# Patient Record
Sex: FEMALE | Race: WHITE | NOT HISPANIC OR LATINO | Employment: OTHER | URBAN - METROPOLITAN AREA
[De-identification: names, ages, dates, MRNs, and addresses within clinical notes are randomized per-mention and may not be internally consistent; named-entity substitution may affect disease eponyms.]

---

## 2021-02-23 DIAGNOSIS — Z01.812 PRE-OPERATIVE LABORATORY EXAMINATION: Primary | ICD-10-CM

## 2021-02-23 RX ORDER — METOPROLOL SUCCINATE 25 MG/1
25 TABLET, EXTENDED RELEASE ORAL
Status: ON HOLD | COMMUNITY
End: 2021-03-09

## 2021-02-23 RX ORDER — ACETAMINOPHEN 160 MG
2000 TABLET,DISINTEGRATING ORAL DAILY
COMMUNITY

## 2021-02-23 RX ORDER — LORATADINE 10 MG/1
10 TABLET ORAL
COMMUNITY

## 2021-02-23 RX ORDER — AMOXICILLIN 500 MG
1200 CAPSULE ORAL DAILY
COMMUNITY

## 2021-02-23 RX ORDER — PYRIDOXINE HCL (VITAMIN B6) 100 MG
1 TABLET ORAL DAILY
COMMUNITY

## 2021-02-23 RX ORDER — LACTOBACILLUS RHAMNOSUS GG 10B CELL
1 CAPSULE ORAL
COMMUNITY

## 2021-02-23 RX ORDER — GABAPENTIN 300 MG/1
600 CAPSULE ORAL DAILY
COMMUNITY

## 2021-02-23 RX ORDER — NAPROXEN SODIUM 220 MG
220 TABLET ORAL 2 TIMES DAILY WITH MEALS
COMMUNITY

## 2021-02-23 RX ORDER — ACETAMINOPHEN 500 MG
1000 TABLET ORAL EVERY 6 HOURS PRN
COMMUNITY

## 2021-02-23 RX ORDER — SIMVASTATIN 20 MG
20 TABLET ORAL AT BEDTIME
COMMUNITY

## 2021-02-23 ASSESSMENT — MIFFLIN-ST. JEOR: SCORE: 2026.56

## 2021-03-04 ASSESSMENT — MIFFLIN-ST. JEOR: SCORE: 2009.01

## 2021-03-05 DIAGNOSIS — Z01.812 PRE-OPERATIVE LABORATORY EXAMINATION: ICD-10-CM

## 2021-03-05 LAB
SARS-COV-2 RNA RESP QL NAA+PROBE: NORMAL
SPECIMEN SOURCE: NORMAL

## 2021-03-05 PROCEDURE — U0005 INFEC AGEN DETEC AMPLI PROBE: HCPCS | Performed by: ORTHOPAEDIC SURGERY

## 2021-03-05 PROCEDURE — U0003 INFECTIOUS AGENT DETECTION BY NUCLEIC ACID (DNA OR RNA); SEVERE ACUTE RESPIRATORY SYNDROME CORONAVIRUS 2 (SARS-COV-2) (CORONAVIRUS DISEASE [COVID-19]), AMPLIFIED PROBE TECHNIQUE, MAKING USE OF HIGH THROUGHPUT TECHNOLOGIES AS DESCRIBED BY CMS-2020-01-R: HCPCS | Performed by: ORTHOPAEDIC SURGERY

## 2021-03-05 NOTE — PHARMACY-ADMISSION MEDICATION HISTORY
Admission medication history interview status for this patient is complete. See [facility] admission navigator for allergy information, prior to admission medications and immunization status.     PTA meds completed by pre-admitting nurse Jayshree Lao and reviewed by pharmacy       Prior to Admission medications    Medication Sig Last Dose Taking? Auth Provider   acetaminophen (TYLENOL) 500 MG tablet Take 1,000 mg by mouth every 6 hours as needed for mild pain  Yes Reported, Patient   calcium carbonate 600 mg-vitamin D 400 units (CALTRATE) 600-400 MG-UNIT per tablet Take 1 tablet by mouth daily  Yes Reported, Patient   Cholecalciferol (VITAMIN D3) 50 MCG (2000 UT) CAPS Take 2,000 Int'l Units by mouth daily  Yes Reported, Patient   Cranberry 500 MG CAPS Take 1 capsule by mouth daily  Yes Reported, Patient   gabapentin (NEURONTIN) 300 MG capsule Take 600 mg by mouth daily  Yes Reported, Patient   lactobacillus rhamnosus, GG, (CULTURELL) capsule Take 1 capsule by mouth daily (with dinner)  Yes Reported, Patient   loratadine (CLARITIN) 10 MG tablet Take 10 mg by mouth daily (with dinner)  Yes Reported, Patient   magnesium oxide 400 MG CAPS Take 1 capsule by mouth At Bedtime  Yes Reported, Patient   metoprolol succinate ER (TOPROL-XL) 25 MG 24 hr tablet Take 25 mg by mouth daily (with dinner)  Yes Reported, Patient   naproxen sodium (ANAPROX) 220 MG tablet Take 220 mg by mouth 2 times daily (with meals)  Yes Reported, Patient   Omega-3 Fatty Acids (FISH OIL) 1200 MG capsule Take 1,200 mg by mouth daily  Yes Reported, Patient   simvastatin (ZOCOR) 20 MG tablet Take 20 mg by mouth At Bedtime  Yes Reported, Patient

## 2021-03-06 LAB
LABORATORY COMMENT REPORT: NORMAL
SARS-COV-2 RNA RESP QL NAA+PROBE: NEGATIVE
SPECIMEN SOURCE: NORMAL

## 2021-03-08 ENCOUNTER — HOSPITAL ENCOUNTER (OUTPATIENT)
Facility: CLINIC | Age: 51
Discharge: HOME | End: 2021-03-09
Attending: ORTHOPAEDIC SURGERY | Admitting: ORTHOPAEDIC SURGERY
Payer: COMMERCIAL

## 2021-03-08 ENCOUNTER — ANESTHESIA (OUTPATIENT)
Dept: SURGERY | Facility: CLINIC | Age: 51
End: 2021-03-08
Payer: COMMERCIAL

## 2021-03-08 ENCOUNTER — ANESTHESIA EVENT (OUTPATIENT)
Dept: SURGERY | Facility: CLINIC | Age: 51
End: 2021-03-08
Payer: COMMERCIAL

## 2021-03-08 ENCOUNTER — APPOINTMENT (OUTPATIENT)
Dept: GENERAL RADIOLOGY | Facility: CLINIC | Age: 51
End: 2021-03-08
Attending: ORTHOPAEDIC SURGERY
Payer: COMMERCIAL

## 2021-03-08 ENCOUNTER — APPOINTMENT (OUTPATIENT)
Dept: PHYSICAL THERAPY | Facility: CLINIC | Age: 51
End: 2021-03-08
Attending: ORTHOPAEDIC SURGERY
Payer: COMMERCIAL

## 2021-03-08 DIAGNOSIS — Z96.641 STATUS POST TOTAL REPLACEMENT OF RIGHT HIP: Primary | ICD-10-CM

## 2021-03-08 PROBLEM — Z96.649 S/P TOTAL HIP ARTHROPLASTY: Status: ACTIVE | Noted: 2021-03-08

## 2021-03-08 LAB
CREAT SERPL-MCNC: 0.68 MG/DL (ref 0.52–1.04)
GFR SERPL CREATININE-BSD FRML MDRD: >90 ML/MIN/{1.73_M2}
HCG UR QL: NEGATIVE

## 2021-03-08 PROCEDURE — 97161 PT EVAL LOW COMPLEX 20 MIN: CPT | Mod: GP | Performed by: PHYSICAL THERAPIST

## 2021-03-08 PROCEDURE — 250N000013 HC RX MED GY IP 250 OP 250 PS 637: Performed by: ORTHOPAEDIC SURGERY

## 2021-03-08 PROCEDURE — 250N000013 HC RX MED GY IP 250 OP 250 PS 637: Performed by: PHYSICIAN ASSISTANT

## 2021-03-08 PROCEDURE — 999N000065 XR PELVIS AND HIP RIGHT 1 VIEW

## 2021-03-08 PROCEDURE — 360N000077 HC SURGERY LEVEL 4, PER MIN: Performed by: ORTHOPAEDIC SURGERY

## 2021-03-08 PROCEDURE — 250N000009 HC RX 250: Performed by: NURSE ANESTHETIST, CERTIFIED REGISTERED

## 2021-03-08 PROCEDURE — 99207 PR CDG-CODE CATEGORY CHANGED: CPT | Performed by: HOSPITALIST

## 2021-03-08 PROCEDURE — 36415 COLL VENOUS BLD VENIPUNCTURE: CPT | Performed by: ORTHOPAEDIC SURGERY

## 2021-03-08 PROCEDURE — 250N000011 HC RX IP 250 OP 636: Performed by: ANESTHESIOLOGY

## 2021-03-08 PROCEDURE — 272N000002 HC OR SUPPLY OTHER OPNP: Performed by: ORTHOPAEDIC SURGERY

## 2021-03-08 PROCEDURE — 710N000009 HC RECOVERY PHASE 1, LEVEL 1, PER MIN: Performed by: ORTHOPAEDIC SURGERY

## 2021-03-08 PROCEDURE — 999N000141 HC STATISTIC PRE-PROCEDURE NURSING ASSESSMENT: Performed by: ORTHOPAEDIC SURGERY

## 2021-03-08 PROCEDURE — 99203 OFFICE O/P NEW LOW 30 MIN: CPT | Performed by: HOSPITALIST

## 2021-03-08 PROCEDURE — 258N000003 HC RX IP 258 OP 636: Performed by: ORTHOPAEDIC SURGERY

## 2021-03-08 PROCEDURE — 272N000001 HC OR GENERAL SUPPLY STERILE: Performed by: ORTHOPAEDIC SURGERY

## 2021-03-08 PROCEDURE — 250N000011 HC RX IP 250 OP 636: Performed by: NURSE ANESTHETIST, CERTIFIED REGISTERED

## 2021-03-08 PROCEDURE — 258N000003 HC RX IP 258 OP 636: Performed by: ANESTHESIOLOGY

## 2021-03-08 PROCEDURE — 82565 ASSAY OF CREATININE: CPT | Performed by: ORTHOPAEDIC SURGERY

## 2021-03-08 PROCEDURE — C1713 ANCHOR/SCREW BN/BN,TIS/BN: HCPCS | Performed by: ORTHOPAEDIC SURGERY

## 2021-03-08 PROCEDURE — 370N000017 HC ANESTHESIA TECHNICAL FEE, PER MIN: Performed by: ORTHOPAEDIC SURGERY

## 2021-03-08 PROCEDURE — 999N000063 XR HIP PORT RT 1 VW: Mod: TC

## 2021-03-08 PROCEDURE — 250N000011 HC RX IP 250 OP 636: Performed by: PHYSICIAN ASSISTANT

## 2021-03-08 PROCEDURE — C1776 JOINT DEVICE (IMPLANTABLE): HCPCS | Performed by: ORTHOPAEDIC SURGERY

## 2021-03-08 PROCEDURE — 250N000011 HC RX IP 250 OP 636: Performed by: ORTHOPAEDIC SURGERY

## 2021-03-08 PROCEDURE — 250N000013 HC RX MED GY IP 250 OP 250 PS 637: Performed by: HOSPITALIST

## 2021-03-08 PROCEDURE — 81025 URINE PREGNANCY TEST: CPT | Performed by: ANESTHESIOLOGY

## 2021-03-08 PROCEDURE — 97530 THERAPEUTIC ACTIVITIES: CPT | Mod: GP | Performed by: PHYSICAL THERAPIST

## 2021-03-08 DEVICE — IMPLANTABLE DEVICE: Type: IMPLANTABLE DEVICE | Site: HIP | Status: FUNCTIONAL

## 2021-03-08 DEVICE — IMP SCR ZIM 6.5X20MM ACET CUP SELF TAP 00-6250-065-20: Type: IMPLANTABLE DEVICE | Site: HIP | Status: FUNCTIONAL

## 2021-03-08 DEVICE — IMPLANTABLE DEVICE
Type: IMPLANTABLE DEVICE | Site: HIP | Status: FUNCTIONAL
Brand: G7® ACETABULAR SYSTEM

## 2021-03-08 DEVICE — IMPLANTABLE DEVICE
Type: IMPLANTABLE DEVICE | Site: HIP | Status: FUNCTIONAL
Brand: G7® DUAL MOBILITY ACETABULAR SYSTEM

## 2021-03-08 RX ORDER — SODIUM CHLORIDE, SODIUM LACTATE, POTASSIUM CHLORIDE, CALCIUM CHLORIDE 600; 310; 30; 20 MG/100ML; MG/100ML; MG/100ML; MG/100ML
INJECTION, SOLUTION INTRAVENOUS CONTINUOUS
Status: DISCONTINUED | OUTPATIENT
Start: 2021-03-08 | End: 2021-03-09 | Stop reason: HOSPADM

## 2021-03-08 RX ORDER — ONDANSETRON 2 MG/ML
4 INJECTION INTRAMUSCULAR; INTRAVENOUS EVERY 6 HOURS
Status: DISCONTINUED | OUTPATIENT
Start: 2021-03-08 | End: 2021-03-08

## 2021-03-08 RX ORDER — HYDROMORPHONE HCL IN WATER/PF 6 MG/30 ML
0.2 PATIENT CONTROLLED ANALGESIA SYRINGE INTRAVENOUS
Status: DISCONTINUED | OUTPATIENT
Start: 2021-03-08 | End: 2021-03-09 | Stop reason: HOSPADM

## 2021-03-08 RX ORDER — NALOXONE HYDROCHLORIDE 0.4 MG/ML
0.2 INJECTION, SOLUTION INTRAMUSCULAR; INTRAVENOUS; SUBCUTANEOUS
Status: ACTIVE | OUTPATIENT
Start: 2021-03-08 | End: 2021-03-09

## 2021-03-08 RX ORDER — OXYCODONE HYDROCHLORIDE 5 MG/1
5 TABLET ORAL EVERY 4 HOURS PRN
Status: DISCONTINUED | OUTPATIENT
Start: 2021-03-08 | End: 2021-03-09 | Stop reason: HOSPADM

## 2021-03-08 RX ORDER — ONDANSETRON 4 MG/1
4 TABLET, ORALLY DISINTEGRATING ORAL EVERY 30 MIN PRN
Status: DISCONTINUED | OUTPATIENT
Start: 2021-03-08 | End: 2021-03-08 | Stop reason: HOSPADM

## 2021-03-08 RX ORDER — ONDANSETRON 2 MG/ML
4 INJECTION INTRAMUSCULAR; INTRAVENOUS EVERY 30 MIN PRN
Status: DISCONTINUED | OUTPATIENT
Start: 2021-03-08 | End: 2021-03-08 | Stop reason: HOSPADM

## 2021-03-08 RX ORDER — NEOSTIGMINE METHYLSULFATE 1 MG/ML
VIAL (ML) INJECTION PRN
Status: DISCONTINUED | OUTPATIENT
Start: 2021-03-08 | End: 2021-03-08

## 2021-03-08 RX ORDER — PROPOFOL 10 MG/ML
INJECTION, EMULSION INTRAVENOUS CONTINUOUS PRN
Status: DISCONTINUED | OUTPATIENT
Start: 2021-03-08 | End: 2021-03-08

## 2021-03-08 RX ORDER — HYDROXYZINE HYDROCHLORIDE 25 MG/1
25 TABLET, FILM COATED ORAL EVERY 6 HOURS PRN
Status: DISCONTINUED | OUTPATIENT
Start: 2021-03-08 | End: 2021-03-09 | Stop reason: HOSPADM

## 2021-03-08 RX ORDER — NALOXONE HYDROCHLORIDE 0.4 MG/ML
0.4 INJECTION, SOLUTION INTRAMUSCULAR; INTRAVENOUS; SUBCUTANEOUS
Status: ACTIVE | OUTPATIENT
Start: 2021-03-08 | End: 2021-03-09

## 2021-03-08 RX ORDER — GABAPENTIN 300 MG/1
600 CAPSULE ORAL DAILY
Status: DISCONTINUED | OUTPATIENT
Start: 2021-03-08 | End: 2021-03-09 | Stop reason: HOSPADM

## 2021-03-08 RX ORDER — LABETALOL 20 MG/4 ML (5 MG/ML) INTRAVENOUS SYRINGE
10
Status: DISCONTINUED | OUTPATIENT
Start: 2021-03-08 | End: 2021-03-08 | Stop reason: HOSPADM

## 2021-03-08 RX ORDER — BISACODYL 10 MG
10 SUPPOSITORY, RECTAL RECTAL DAILY PRN
Status: DISCONTINUED | OUTPATIENT
Start: 2021-03-08 | End: 2021-03-09 | Stop reason: HOSPADM

## 2021-03-08 RX ORDER — ONDANSETRON 2 MG/ML
INJECTION INTRAMUSCULAR; INTRAVENOUS PRN
Status: DISCONTINUED | OUTPATIENT
Start: 2021-03-08 | End: 2021-03-08

## 2021-03-08 RX ORDER — OXYCODONE HYDROCHLORIDE 5 MG/1
5-10 TABLET ORAL
Qty: 60 TABLET | Refills: 0 | Status: SHIPPED | OUTPATIENT
Start: 2021-03-08 | End: 2023-10-25

## 2021-03-08 RX ORDER — SODIUM CHLORIDE, SODIUM LACTATE, POTASSIUM CHLORIDE, CALCIUM CHLORIDE 600; 310; 30; 20 MG/100ML; MG/100ML; MG/100ML; MG/100ML
INJECTION, SOLUTION INTRAVENOUS CONTINUOUS
Status: DISCONTINUED | OUTPATIENT
Start: 2021-03-08 | End: 2021-03-08 | Stop reason: HOSPADM

## 2021-03-08 RX ORDER — DOCUSATE SODIUM 100 MG/1
100 CAPSULE, LIQUID FILLED ORAL 2 TIMES DAILY
Status: DISCONTINUED | OUTPATIENT
Start: 2021-03-08 | End: 2021-03-09 | Stop reason: HOSPADM

## 2021-03-08 RX ORDER — MAGNESIUM OXIDE 400 MG/1
400 TABLET ORAL AT BEDTIME
Status: DISCONTINUED | OUTPATIENT
Start: 2021-03-08 | End: 2021-03-09 | Stop reason: HOSPADM

## 2021-03-08 RX ORDER — CEFAZOLIN SODIUM 2 G/100ML
2 INJECTION, SOLUTION INTRAVENOUS EVERY 8 HOURS
Status: COMPLETED | OUTPATIENT
Start: 2021-03-08 | End: 2021-03-09

## 2021-03-08 RX ORDER — PROCHLORPERAZINE MALEATE 5 MG
10 TABLET ORAL EVERY 6 HOURS PRN
Status: DISCONTINUED | OUTPATIENT
Start: 2021-03-08 | End: 2021-03-09 | Stop reason: HOSPADM

## 2021-03-08 RX ORDER — LIDOCAINE HYDROCHLORIDE 10 MG/ML
INJECTION, SOLUTION INFILTRATION; PERINEURAL PRN
Status: DISCONTINUED | OUTPATIENT
Start: 2021-03-08 | End: 2021-03-08

## 2021-03-08 RX ORDER — HYDROMORPHONE HYDROCHLORIDE 1 MG/ML
.3-.5 INJECTION, SOLUTION INTRAMUSCULAR; INTRAVENOUS; SUBCUTANEOUS EVERY 5 MIN PRN
Status: DISCONTINUED | OUTPATIENT
Start: 2021-03-08 | End: 2021-03-08 | Stop reason: HOSPADM

## 2021-03-08 RX ORDER — ONDANSETRON 2 MG/ML
4 INJECTION INTRAMUSCULAR; INTRAVENOUS EVERY 6 HOURS PRN
Status: DISCONTINUED | OUTPATIENT
Start: 2021-03-08 | End: 2021-03-09 | Stop reason: HOSPADM

## 2021-03-08 RX ORDER — PROPOFOL 10 MG/ML
INJECTION, EMULSION INTRAVENOUS PRN
Status: DISCONTINUED | OUTPATIENT
Start: 2021-03-08 | End: 2021-03-08

## 2021-03-08 RX ORDER — LIDOCAINE 40 MG/G
CREAM TOPICAL
Status: DISCONTINUED | OUTPATIENT
Start: 2021-03-08 | End: 2021-03-09 | Stop reason: HOSPADM

## 2021-03-08 RX ORDER — ONDANSETRON 4 MG/1
4 TABLET, ORALLY DISINTEGRATING ORAL EVERY 6 HOURS PRN
Status: DISCONTINUED | OUTPATIENT
Start: 2021-03-08 | End: 2021-03-09 | Stop reason: HOSPADM

## 2021-03-08 RX ORDER — CEFAZOLIN SODIUM 1 G/3ML
1 INJECTION, POWDER, FOR SOLUTION INTRAMUSCULAR; INTRAVENOUS SEE ADMIN INSTRUCTIONS
Status: DISCONTINUED | OUTPATIENT
Start: 2021-03-08 | End: 2021-03-08 | Stop reason: HOSPADM

## 2021-03-08 RX ORDER — OXYCODONE HYDROCHLORIDE 5 MG/1
10 TABLET ORAL EVERY 4 HOURS PRN
Status: DISCONTINUED | OUTPATIENT
Start: 2021-03-08 | End: 2021-03-09 | Stop reason: HOSPADM

## 2021-03-08 RX ORDER — HYDROMORPHONE HYDROCHLORIDE 1 MG/ML
0.4 INJECTION, SOLUTION INTRAMUSCULAR; INTRAVENOUS; SUBCUTANEOUS
Status: DISCONTINUED | OUTPATIENT
Start: 2021-03-08 | End: 2021-03-09 | Stop reason: HOSPADM

## 2021-03-08 RX ORDER — TRANEXAMIC ACID 650 MG/1
1950 TABLET ORAL ONCE
Status: COMPLETED | OUTPATIENT
Start: 2021-03-08 | End: 2021-03-08

## 2021-03-08 RX ORDER — ACETAMINOPHEN 325 MG/1
975 TABLET ORAL EVERY 8 HOURS
Status: DISCONTINUED | OUTPATIENT
Start: 2021-03-08 | End: 2021-03-09 | Stop reason: HOSPADM

## 2021-03-08 RX ORDER — GLYCOPYRROLATE 0.2 MG/ML
INJECTION, SOLUTION INTRAMUSCULAR; INTRAVENOUS PRN
Status: DISCONTINUED | OUTPATIENT
Start: 2021-03-08 | End: 2021-03-08

## 2021-03-08 RX ORDER — HYDRALAZINE HYDROCHLORIDE 20 MG/ML
2.5-5 INJECTION INTRAMUSCULAR; INTRAVENOUS EVERY 10 MIN PRN
Status: DISCONTINUED | OUTPATIENT
Start: 2021-03-08 | End: 2021-03-08 | Stop reason: HOSPADM

## 2021-03-08 RX ORDER — KETAMINE HYDROCHLORIDE 10 MG/ML
INJECTION INTRAMUSCULAR; INTRAVENOUS PRN
Status: DISCONTINUED | OUTPATIENT
Start: 2021-03-08 | End: 2021-03-08

## 2021-03-08 RX ORDER — FAMOTIDINE 20 MG/1
20 TABLET, FILM COATED ORAL 2 TIMES DAILY
Status: DISCONTINUED | OUTPATIENT
Start: 2021-03-08 | End: 2021-03-09 | Stop reason: HOSPADM

## 2021-03-08 RX ORDER — AMOXICILLIN 250 MG
1-2 CAPSULE ORAL 2 TIMES DAILY
Qty: 30 TABLET | Refills: 0 | Status: SHIPPED | OUTPATIENT
Start: 2021-03-08 | End: 2023-10-25

## 2021-03-08 RX ORDER — AMOXICILLIN 250 MG
1 CAPSULE ORAL 2 TIMES DAILY
Status: DISCONTINUED | OUTPATIENT
Start: 2021-03-08 | End: 2021-03-09 | Stop reason: HOSPADM

## 2021-03-08 RX ORDER — ACETAMINOPHEN 325 MG/1
650 TABLET ORAL EVERY 4 HOURS PRN
Qty: 100 TABLET | Refills: 0 | Status: SHIPPED | OUTPATIENT
Start: 2021-03-08 | End: 2023-10-25

## 2021-03-08 RX ORDER — DEXAMETHASONE SODIUM PHOSPHATE 4 MG/ML
INJECTION, SOLUTION INTRA-ARTICULAR; INTRALESIONAL; INTRAMUSCULAR; INTRAVENOUS; SOFT TISSUE PRN
Status: DISCONTINUED | OUTPATIENT
Start: 2021-03-08 | End: 2021-03-08

## 2021-03-08 RX ORDER — IBUPROFEN 600 MG/1
600 TABLET, FILM COATED ORAL EVERY 6 HOURS PRN
Status: DISCONTINUED | OUTPATIENT
Start: 2021-03-08 | End: 2021-03-09 | Stop reason: HOSPADM

## 2021-03-08 RX ORDER — FENTANYL CITRATE 50 UG/ML
INJECTION, SOLUTION INTRAMUSCULAR; INTRAVENOUS PRN
Status: DISCONTINUED | OUTPATIENT
Start: 2021-03-08 | End: 2021-03-08

## 2021-03-08 RX ORDER — FENTANYL CITRATE 50 UG/ML
25-50 INJECTION, SOLUTION INTRAMUSCULAR; INTRAVENOUS
Status: DISCONTINUED | OUTPATIENT
Start: 2021-03-08 | End: 2021-03-08 | Stop reason: HOSPADM

## 2021-03-08 RX ORDER — CEFAZOLIN SODIUM IN 0.9 % NACL 3 G/100 ML
3 INTRAVENOUS SOLUTION, PIGGYBACK (ML) INTRAVENOUS
Status: COMPLETED | OUTPATIENT
Start: 2021-03-08 | End: 2021-03-08

## 2021-03-08 RX ORDER — HYDROXYZINE HYDROCHLORIDE 25 MG/1
25 TABLET, FILM COATED ORAL EVERY 6 HOURS PRN
Qty: 30 TABLET | Refills: 0 | Status: SHIPPED | OUTPATIENT
Start: 2021-03-08 | End: 2023-10-25

## 2021-03-08 RX ORDER — ACETAMINOPHEN 325 MG/1
650 TABLET ORAL EVERY 4 HOURS PRN
Status: DISCONTINUED | OUTPATIENT
Start: 2021-03-11 | End: 2021-03-09 | Stop reason: HOSPADM

## 2021-03-08 RX ORDER — POLYETHYLENE GLYCOL 3350 17 G/17G
17 POWDER, FOR SOLUTION ORAL DAILY
Status: DISCONTINUED | OUTPATIENT
Start: 2021-03-09 | End: 2021-03-09 | Stop reason: HOSPADM

## 2021-03-08 RX ORDER — SIMVASTATIN 20 MG
20 TABLET ORAL AT BEDTIME
Status: DISCONTINUED | OUTPATIENT
Start: 2021-03-08 | End: 2021-03-09 | Stop reason: HOSPADM

## 2021-03-08 RX ORDER — ACETAMINOPHEN 325 MG/1
975 TABLET ORAL ONCE
Status: COMPLETED | OUTPATIENT
Start: 2021-03-08 | End: 2021-03-08

## 2021-03-08 RX ORDER — DIPHENHYDRAMINE HCL 25 MG
25 CAPSULE ORAL EVERY 6 HOURS PRN
Status: DISCONTINUED | OUTPATIENT
Start: 2021-03-08 | End: 2021-03-09 | Stop reason: HOSPADM

## 2021-03-08 RX ADMIN — Medication 3 G: at 09:47

## 2021-03-08 RX ADMIN — FENTANYL CITRATE 50 MCG: 50 INJECTION, SOLUTION INTRAMUSCULAR; INTRAVENOUS at 13:32

## 2021-03-08 RX ADMIN — SODIUM CHLORIDE, POTASSIUM CHLORIDE, SODIUM LACTATE AND CALCIUM CHLORIDE: 600; 310; 30; 20 INJECTION, SOLUTION INTRAVENOUS at 12:15

## 2021-03-08 RX ADMIN — PROPOFOL 200 MG: 10 INJECTION, EMULSION INTRAVENOUS at 09:58

## 2021-03-08 RX ADMIN — IBUPROFEN 600 MG: 600 TABLET, FILM COATED ORAL at 13:45

## 2021-03-08 RX ADMIN — SIMVASTATIN 20 MG: 20 TABLET, FILM COATED ORAL at 22:12

## 2021-03-08 RX ADMIN — ONDANSETRON 4 MG: 2 INJECTION INTRAMUSCULAR; INTRAVENOUS at 18:47

## 2021-03-08 RX ADMIN — HYDROMORPHONE HYDROCHLORIDE 0.5 MG: 1 INJECTION, SOLUTION INTRAMUSCULAR; INTRAVENOUS; SUBCUTANEOUS at 13:22

## 2021-03-08 RX ADMIN — OXYCODONE HYDROCHLORIDE 10 MG: 5 TABLET ORAL at 17:45

## 2021-03-08 RX ADMIN — SODIUM CHLORIDE, POTASSIUM CHLORIDE, SODIUM LACTATE AND CALCIUM CHLORIDE: 600; 310; 30; 20 INJECTION, SOLUTION INTRAVENOUS at 18:47

## 2021-03-08 RX ADMIN — MIDAZOLAM 2 MG: 1 INJECTION INTRAMUSCULAR; INTRAVENOUS at 09:47

## 2021-03-08 RX ADMIN — HYDROMORPHONE HYDROCHLORIDE 0.5 MG: 1 INJECTION, SOLUTION INTRAMUSCULAR; INTRAVENOUS; SUBCUTANEOUS at 13:46

## 2021-03-08 RX ADMIN — HYDROMORPHONE HYDROCHLORIDE 0.5 MG: 1 INJECTION, SOLUTION INTRAMUSCULAR; INTRAVENOUS; SUBCUTANEOUS at 13:36

## 2021-03-08 RX ADMIN — DOCUSATE SODIUM 50 MG AND SENNOSIDES 8.6 MG 1 TABLET: 8.6; 5 TABLET, FILM COATED ORAL at 19:56

## 2021-03-08 RX ADMIN — GLYCOPYRROLATE 0.2 MG: 0.2 INJECTION, SOLUTION INTRAMUSCULAR; INTRAVENOUS at 09:58

## 2021-03-08 RX ADMIN — FENTANYL CITRATE 50 MCG: 50 INJECTION, SOLUTION INTRAMUSCULAR; INTRAVENOUS at 13:03

## 2021-03-08 RX ADMIN — TRANEXAMIC ACID 1950 MG: 650 TABLET ORAL at 08:33

## 2021-03-08 RX ADMIN — DEXAMETHASONE SODIUM PHOSPHATE 4 MG: 4 INJECTION, SOLUTION INTRA-ARTICULAR; INTRALESIONAL; INTRAMUSCULAR; INTRAVENOUS; SOFT TISSUE at 09:58

## 2021-03-08 RX ADMIN — ONDANSETRON HYDROCHLORIDE 4 MG: 2 INJECTION, SOLUTION INTRAVENOUS at 11:51

## 2021-03-08 RX ADMIN — ROCURONIUM BROMIDE 50 MG: 10 INJECTION INTRAVENOUS at 09:58

## 2021-03-08 RX ADMIN — FENTANYL CITRATE 50 MCG: 50 INJECTION, SOLUTION INTRAMUSCULAR; INTRAVENOUS at 13:15

## 2021-03-08 RX ADMIN — ROCURONIUM BROMIDE 20 MG: 10 INJECTION INTRAVENOUS at 11:30

## 2021-03-08 RX ADMIN — SODIUM CHLORIDE, POTASSIUM CHLORIDE, SODIUM LACTATE AND CALCIUM CHLORIDE: 600; 310; 30; 20 INJECTION, SOLUTION INTRAVENOUS at 09:43

## 2021-03-08 RX ADMIN — OXYCODONE HYDROCHLORIDE 5 MG: 5 TABLET ORAL at 13:45

## 2021-03-08 RX ADMIN — Medication 400 MG: at 22:12

## 2021-03-08 RX ADMIN — LIDOCAINE HYDROCHLORIDE 40 MG: 10 INJECTION, SOLUTION INFILTRATION; PERINEURAL at 09:58

## 2021-03-08 RX ADMIN — PROPOFOL 40 MCG/KG/MIN: 10 INJECTION, EMULSION INTRAVENOUS at 10:05

## 2021-03-08 RX ADMIN — GLYCOPYRROLATE 0.8 MG: 0.2 INJECTION, SOLUTION INTRAMUSCULAR; INTRAVENOUS at 12:05

## 2021-03-08 RX ADMIN — FENTANYL CITRATE 100 MCG: 50 INJECTION, SOLUTION INTRAMUSCULAR; INTRAVENOUS at 09:58

## 2021-03-08 RX ADMIN — HYDROMORPHONE HYDROCHLORIDE 0.5 MG: 1 INJECTION, SOLUTION INTRAMUSCULAR; INTRAVENOUS; SUBCUTANEOUS at 12:41

## 2021-03-08 RX ADMIN — HYDROXYZINE HYDROCHLORIDE 25 MG: 25 TABLET, FILM COATED ORAL at 13:45

## 2021-03-08 RX ADMIN — IBUPROFEN 600 MG: 600 TABLET, FILM COATED ORAL at 19:57

## 2021-03-08 RX ADMIN — HYDROXYZINE HYDROCHLORIDE 25 MG: 25 TABLET, FILM COATED ORAL at 19:57

## 2021-03-08 RX ADMIN — HYDROMORPHONE HYDROCHLORIDE 0.5 MG: 1 INJECTION, SOLUTION INTRAMUSCULAR; INTRAVENOUS; SUBCUTANEOUS at 12:09

## 2021-03-08 RX ADMIN — GABAPENTIN 600 MG: 300 CAPSULE ORAL at 17:45

## 2021-03-08 RX ADMIN — SODIUM CHLORIDE 1000 ML: 9 INJECTION, SOLUTION INTRAVENOUS at 15:56

## 2021-03-08 RX ADMIN — OXYCODONE HYDROCHLORIDE 10 MG: 5 TABLET ORAL at 22:12

## 2021-03-08 RX ADMIN — CEFAZOLIN SODIUM 2 G: 2 INJECTION, SOLUTION INTRAVENOUS at 17:46

## 2021-03-08 RX ADMIN — FAMOTIDINE 20 MG: 20 TABLET ORAL at 19:57

## 2021-03-08 RX ADMIN — Medication 50 MG: at 10:22

## 2021-03-08 RX ADMIN — HYDROMORPHONE HYDROCHLORIDE 1 MG: 1 INJECTION, SOLUTION INTRAMUSCULAR; INTRAVENOUS; SUBCUTANEOUS at 10:23

## 2021-03-08 RX ADMIN — FENTANYL CITRATE 50 MCG: 50 INJECTION, SOLUTION INTRAMUSCULAR; INTRAVENOUS at 12:50

## 2021-03-08 RX ADMIN — HYDROMORPHONE HYDROCHLORIDE 0.5 MG: 1 INJECTION, SOLUTION INTRAMUSCULAR; INTRAVENOUS; SUBCUTANEOUS at 13:08

## 2021-03-08 RX ADMIN — ACETAMINOPHEN 975 MG: 325 TABLET, FILM COATED ORAL at 08:34

## 2021-03-08 RX ADMIN — DOCUSATE SODIUM 100 MG: 100 CAPSULE, LIQUID FILLED ORAL at 19:56

## 2021-03-08 RX ADMIN — HYDROMORPHONE HYDROCHLORIDE 0.4 MG: 1 INJECTION, SOLUTION INTRAMUSCULAR; INTRAVENOUS; SUBCUTANEOUS at 15:38

## 2021-03-08 RX ADMIN — NEOSTIGMINE METHYLSULFATE 5 MG: 1 INJECTION, SOLUTION INTRAVENOUS at 12:05

## 2021-03-08 RX ADMIN — ACETAMINOPHEN 975 MG: 325 TABLET, FILM COATED ORAL at 15:57

## 2021-03-08 ASSESSMENT — MIFFLIN-ST. JEOR: SCORE: 1997.17

## 2021-03-08 NOTE — PROGRESS NOTES
SPIRITUAL HEALTH SERVICES Progress Note  [facility] Pre Surg    As part of the intake process for her surgery Yuliet, the pt indicated that she would be open to a visit from [facility].    DATA:   Pt, Yuliet shared with this author that this hip surgery would be her 11th surgery. When asked about anticipated physical therapy that would follow Yuliet shared that the anticipated recovery time from this surgery shouldn't be be as long as other recovery form other surgeries she has had in the past. Yuliet responded that she was feeling some anxiety about the surgery and is looking forward to its successful completion.  Anurag was in the room with Yuliet as part of her support.    When asked this author asked the pt if she desired prayer to be part of the our visit she stated that that would be good. Pt identifies as a Hinduism.   Pt mentioned that she has a strong network of family and friends for support and prayer.  This author offered shared prayer.     Plan: Yuliet has been scheduled to be in the hospital for one night after her hip surgery. Pt was made aware the [facility] are available to her upon request through her nurse.    Duane Bauer   Intern

## 2021-03-08 NOTE — BRIEF OP NOTE
[facility]    Brief Operative Note    Pre-operative diagnosis: DJD (degenerative joint disease) [M19.90]  Post-operative diagnosis Same as pre-operative diagnosis    Procedure: Procedure(s):  Right total hip arthroplasty  Surgeon: Surgeon(s) and Role:     * Toni Zamudio MD - Primary     * Maryann Clarke PA-C - Assisting  Anesthesia: General   Estimated blood loss: Less than 100 ml  Drains: None  Specimens: * No specimens in log *  Findings:   None.  Complications: None.  Implants:   Implant Name Type Inv. Item Serial No.  Lot No. LRB No. Used Action   IMP SCR ZIM 6.5X20MM ACET CUP SELF TAP -708-20 Metallic Hardware/[place] IMP SCR ZIM 6.5X20MM ACET CUP SELF TAP -746-20  [employer] [id number] Right 21 Implanted   IMP SHELL BIOM G7 ACETAB PPS CAMPBELL HOLE 52MM SZ E [id number] Total Joint Component/Insert IMP SHELL BIOM G7 ACETAB PPS CAMPBELL HOLE 52MM SZ E [id number]  [employer] [id number] Right 1 Implanted   G7 Dual Mobility Acetabular Liner, 42mm Bearing Size, E Size Liner    BIOMET [id number] Right 1 Implanted   IMP STEM FEM BIOM TAPERLOC STD OFFSET TYPE 1 [id number] Total Joint Component/Insert IMP STEM FEM BIOM TAPERLOC STD OFFSET TYPE 1 SZ7 [id number]  [employer] [id number] Right 1 Implanted   BIOLOX  Ceramic Head, 28mm, Type 1 Taper    BIOMET [id number] Right 1 Implanted   Dual Mobility Vivacit-E  Bearing, 28mm I.D., 42mm O.D., Size E    [employer] [id number] Right 1 Implanted

## 2021-03-08 NOTE — PLAN OF CARE
Pt arrived to floor at 1415. A&Ox4. Oriented to room and call light. Aquacel dressing CDI. Cms intact. Given dilaudid for pain level 5/10. DTV. VSS, 2L on capno

## 2021-03-08 NOTE — ANESTHESIA PREPROCEDURE EVALUATION
Anesthesia Pre-Procedure Evaluation    Patient: Yuliet Gallego   MRN: [id number] : 1970        Preoperative Diagnosis: DJD (degenerative joint disease) [M19.90]   Procedure : Procedure(s):  Right total hip arthroplasty     Past Medical History:   Diagnosis Date     Arthritis     hip, back, shoulder     Hypertension      Irregular heart beat      Other chronic pain     back, rt hip into leg     PONV (postoperative nausea and vomiting)     with rotator cuff surgery      Past Surgical History:   Procedure Laterality Date     APPENDECTOMY       CHOLECYSTECTOMY       DECOMPRESS DISC RF LUMBAR       ENT SURGERY      wisdom teeth removal     FUSION CERVICAL ANTERIOR, POSTERIOR THREE+ LEVELS, COMBINED       FUSION SPINE POSTERIOR ONE LEVEL       HEMILAMINECTOMY, DISCECTOMY LUMBAR ONE LEVEL, COMBINED      x2     ROTATOR CUFF REPAIR RT/LT Right       Allergies   Allergen Reactions     Adhesive Tape Blisters     Bees Swelling     Sulfa Drugs Hives     fainting      Social History     Tobacco Use     Smoking status: Former Smoker     Years: 14.00     Types: Cigarettes     Quit date: 2000     Years since quittin.3     Smokeless tobacco: Never Used   Substance Use Topics     Alcohol use: Yes     Comment: 2-3 drinks/month      Wt Readings from Last 1 Encounters:   21 133.8 kg (295 lb)        Anesthesia Evaluation   Pt has had prior anesthetic.     History of anesthetic complications  - PONV.      ROS/MED HX  ENT/Pulmonary:  - neg pulmonary ROS     Neurologic: Comment: Vertigo, HA's - neg neurologic ROS     Cardiovascular:     (+) Dyslipidemia hypertension-----Irregular Heartbeat/Palpitations,     METS/Exercise Tolerance:     Hematologic:       Musculoskeletal: Comment: LBP, neck pain, spinal stenosis  (+) arthritis,     GI/Hepatic:  - neg GI/hepatic ROS     Renal/Genitourinary:  - neg Renal ROS     Endo:     (+) Obesity,     Psychiatric/Substance Use:  - neg psychiatric ROS     Infectious Disease:      "  Malignancy:   (+) Malignancy, History of Skin.    Other:      (+) , H/O Chronic Pain,        Physical Exam    Airway        Mallampati: II   TM distance: > 3 FB   Neck ROM: full   Mouth opening: > 3 cm    Respiratory Devices and Support         Dental           Cardiovascular             Pulmonary                   OUTSIDE LABS:  CBC: No results found for: WBC, HGB, HCT, PLT  BMP: No results found for: NA, POTASSIUM, CHLORIDE, CO2, BUN, CR, GLC  COAGS: No results found for: PTT, INR, FIBR  POC:   Lab Results   Component Value Date    HCG Negative 03/08/2021     HEPATIC: No results found for: ALBUMIN, PROTTOTAL, ALT, AST, GGT, ALKPHOS, BILITOTAL, BILIDIRECT, ALESHIA  OTHER: No results found for: PH, LACT, A1C, LOTUS, PHOS, MAG, LIPASE, AMYLASE, TSH, T4, T3, CRP, SED    Anesthesia Plan    ASA Status:  2      Anesthesia Type: General.     - Airway: ETT   Induction: Intravenous, Propofol.   Maintenance: Balanced.        Consents    Anesthesia Plan(s) and associated risks, benefits, and realistic alternatives discussed. Questions answered and patient/representative(s) expressed understanding.     - Discussed with:  Patient         Postoperative Care    Pain management: IV analgesics.   PONV prophylaxis: Ondansetron (or other 5HT-3), Dexamethasone or Solumedrol     Comments:    Preop \"policy\" between [facility] ref BMI's >40, with \"hard stops\" at 45 became an issue for discussion with this pt, BMI=45.23. I agreed to proceed with understanding by Surgeon and ppt, and Maia lead RN's. Any additional risks agreed to by pt and  was present.            Martín Schaeffer MD  "

## 2021-03-08 NOTE — PLAN OF CARE
Patient vital signs are at baseline: Yes  Patient able to ambulate as they were prior to admission or with assist devices provided by therapies during their stay:  Yes  Patient MUST void prior to discharge:  Yes  Patient able to tolerate oral intake:  Yes  Pain has adequate pain control using Oral analgesics:  Yes    Pain controlled with po pain meds.  CMS intact, dressing dry.  Up with walker, gait belt and assist of 1.

## 2021-03-08 NOTE — PROGRESS NOTES
03/08/21 1717   Quick Adds   Type of Visit Initial PT Evaluation   Living Environment   People in home spouse   Current Living Arrangements house   Home Accessibility stairs to enter home;stairs within home   Number of Stairs, Main Entrance 2   Stair Railings, Main Entrance railings on both sides of stairs   Number of Stairs, Within Home, Primary 6   Stair Railings, Within Home, Primary railing on left side (ascending)   Transportation Anticipated family or friend will provide   Living Environment Comments Pt lives in a 4 level split, atleast 1 rail per set of stairs, needs to access 3 levels of home at discharge.    Self-Care   Usual Activity Tolerance good   Current Activity Tolerance moderate   Regular Exercise No   Equipment Currently Used at Home none   Activity/Exercise/Self-Care Comment owns 3 walkers, raised toilet seats, reachers, grab bars installed, shower chair for walk-in shower   Disability/Function   Fall history within last six months no   Change in Functional Status Since Onset of Current Illness/Injury yes   General Information   Onset of Illness/Injury or Date of Surgery 03/08/21   Referring Physician Toni Zamudio MD   Patient/Family Therapy Goals Statement (PT) Discharge home   Pertinent History of Current Problem (include personal factors and/or comorbidities that impact the POC) Pt is POD0 T BELKIS   Existing Precautions/Restrictions fall   Weight-Bearing Status - RLE weight-bearing as tolerated   Cognition   Orientation Status (Cognition) oriented x 4   Affect/Mental Status (Cognition) WNL   Follows Commands (Cognition) WNL   Pain Assessment   Patient Currently in Pain Yes, see Vital Sign flowsheet   Integumentary/Edema   Integumentary/Edema Comments Dressing intact to R hip   Posture    Posture Forward head position;Protracted shoulders   Range of Motion (ROM)   ROM Comment ROM limited by pain   Strength   Strength Comments Adequate strength for functional mobility   Bed Mobility    Comment (Bed Mobility) sit<>supine with SBA   Transfers   Transfer Safety Comments sit<>stand with CGA   Gait/Stairs (Locomotion)   Distance in Feet (Required for LE Total Joints) 2'   Comment (Gait/Stairs) CGA with FWW   Balance   Balance Comments Requires B UE support for safe dynamic mobility   Sensory Examination   Sensory Perception patient reports no sensory changes   Coordination   Coordination no deficits were identified   Muscle Tone   Muscle Tone no deficits were identified   Clinical Impression   Criteria for Skilled Therapeutic Intervention yes, treatment indicated   PT Diagnosis (PT) Impaired functional mobility   Influenced by the following impairments Pain, weakness, lightheadedness/nausea   Functional limitations due to impairments Difficulty with bed mobility, transfers, ambulation, stairs   Clinical Presentation Stable/Uncomplicated   Clinical Presentation Rationale medically stable, clear POC   Clinical Decision Making (Complexity) low complexity   Therapy Frequency (PT) 2x/day   Predicted Duration of Therapy Intervention (days/wks) 2 days   Planned Therapy Interventions (PT) balance training;bed mobility training;gait training;home exercise program;patient/family education;ROM (range of motion);stair training;strengthening;stretching;transfer training   Risk & Benefits of therapy have been explained evaluation/treatment results reviewed;care plan/treatment goals reviewed;risks/benefits reviewed;current/potential barriers reviewed;participants voiced agreement with care plan;participants included;patient;spouse/significant other   Clinical Impression Comments 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    PT Discharge Planning    PT Rationale for DC Rec Anticipate that at time of discharge pt will be Shai or less for bed mobility, and SBA  for all other transfers and gait skills.    PT Brief overview of current status  Ax1 WW, limited by nausea/lightheadedness   Total Evaluation Time   Total Evaluation Time (Minutes) 5

## 2021-03-08 NOTE — ANESTHESIA POSTPROCEDURE EVALUATION
Patient: Yuliet Gallego    Procedure(s):  Right total hip arthroplasty    Diagnosis:DJD (degenerative joint disease) [M19.90]  Diagnosis Additional Information: No value filed.    Anesthesia Type:  General    Note:  Disposition: Admission   Postop Pain Control: Uneventful            Sign Out: Well controlled pain   PONV: No   Neuro/Psych: Uneventful            Sign Out: Acceptable/Baseline neuro status   Airway/Respiratory: Uneventful            Sign Out: Acceptable/Baseline resp. status   CV/Hemodynamics: Uneventful            Sign Out: Acceptable CV status   Other NRE: NONE   DID A NON-ROUTINE EVENT OCCUR? No         Last vitals:  Vitals:    03/08/21 1500 03/08/21 1553 03/08/21 1630   BP: 112/45  116/54   Pulse: 59  67   Resp: 10 14 16   Temp:   96.3  F (35.7  C)   SpO2: 100%  100%       Last vitals prior to Anesthesia Care Transfer:  CRNA VITALS  3/8/2021 1209 - 3/8/2021 1309      3/8/2021             SpO2:  98 %    EKG:  Sinus rhythm          Electronically Signed By: Sravan Calhoun MD  March 8, 2021  4:51 PM

## 2021-03-08 NOTE — OP NOTE
Procedure Date: 03/08/2021      PREOPERATIVE DIAGNOSIS:  Right hip primary osteoarthritis.      POSTOPERATIVE DIAGNOSIS:  Right hip primary osteoarthritis.      PROCEDURE:  Right total hip arthroplasty using a Biomet Taperloc femoral stem and G7 acetabulum.      SURGEON:  Toni Zamudio MD      FIRST ASSISTANT:  Maryann Clarke PA-C      INDICATIONS:  Ms. Vernon second very pleasant 50-year-old female who has had ongoing right hip pain for some time now, failed conservative measures, oral analgesics, activity modifications and injections, and now wishes to proceed with operative intervention.  We had a long discussion of risks, benefits and alternatives of total hip arthroplasty.  She understands these and wished to proceed with surgery.      NARRATIVE EVENTS:  After thorough evaluation and proper identification of the extremity to be operated on, Ms. Vernon was taken to the operating room where she underwent a general anesthetic, placed in the right lateral decubitus position on the operating table and her right hip was prepped and draped in the usual sterile manner.  After appropriate surgical pause to confirm the patient's extremity to be operated on, 10 minutes after having received 2 grams of Ancef, her right hip was approached through a lateral incision centered over the greater trochanter.  Skin and soft tissue were sharply dissected down to the tensor fascia.  Fascia was split in line with the fibers in line with the femur and taken down to the trochanteric bursa.  Bursal tissue was removed.  The anterior one-third of the gluteus medius was removed off the greater trochanter in modified Hardinge fashion.  Once we were down to the joint capsule, it was T'd and the femoral head and neck were surgically dislocated from the acetabulum.  We then made our femoral neck osteotomy 12 mm proximal to the lesser trochanter, according to our preoperative templating, and removed the femoral head from the field.  At this  point, we exposed the acetabulum, removed the ligament of teres, transverse acetabular ligament and labrum, sequentially reamed the acetabulum up to fit a size 52 Biomet G7 acetabulum, which was then impacted into position in approximately 40 degrees of abduction, 20 degrees of anteversion.  We had good primary stability of the cup.  We augmented this with 2 screws, 1 in the inner table of the pelvis, 1 in the posterior column.  We then put the dual mobility liner to fit a size 42 mm dual mobility bearing surface.  This was impacted into position.  Once this was done, we then irrigated the hip with dilute Betadine solution followed by saline.        We then paid our attention to the femur.  We removed the bone from the piriformis fossa using the entry reamer, lateralized and sequentially broached up to fit a size 7 Biomet Taperloc femoral stem, standard neck offset and a +0 neck length and a 28 mm femoral head with a 42 mm dual mobility bearing.  We had appropriate leg length, soft tissue tension and stability.  At this point, we placed into position a size 7 Biomet Taperloc femoral stem.  Once this was in position, we retrialed our femoral head and found that a 28 mm +0 femoral head with a 42 mm outer diameter dual mobility bearing surface gave us the best stability and full range of motion, so a 28 mm ceramic femoral head was impacted on the trunnion with a 42 mm outer diameter dual mobility bearing.        The hip was then thoroughly irrigated with dilute Betadine solution and saline and reduced.  We then closed the joint capsule with interrupted 0 Vicryl sutures, closed the abductor with #5 Ethibond sutures through bone tunnels, closed the tensor fascia with interrupted #1 Vicryl sutures and a running #1 Stratafix.  We closed the skin and soft tissues with absorbable sutures and staples in the skin.  The patient was placed in a well-padded postoperative dressing and taken to the recovery room in stable  condition.         SRINIVAS MCMULLEN MD             D: 2021   T: 2021   MT: CAITY      Name:     ANDERSON KAPADIA   MRN:      [id number]        Account:        [id number]   :      1970           Procedure Date: 2021      Document: [id number]

## 2021-03-08 NOTE — CONSULTS
[facility]    Hospitalist Consultation    Date of Admission:  3/8/2021    Assessment & Plan   Yuliet Gallego is a 50 year old female w PMH of OA, HTN who presents for planned admission for right BELKIS for chronic right hip OA.   Hospital medicine was consulted for management of chronic medical conditions.    #Right Hip OA s/p right BELKIS on 03/08: Estimated blood loss was noted to be less than 100 cc.  No complications noted.  Patient denies any chest pain, shortness of breath, fevers or chills, abdominal pain, nausea or vomiting.  Postoperative pain under decent control.  Pre-operative Hgb 13.8 in care everywhere on 02/26/2021.   -Postoperative cares including pain management, DVT prophylaxis, diet, activity per primary team.    Chronic Medical Conditions  #HTN: Hold metoprolol this evening given softer BP's.    #HL: Continue home statin  #Chronic Pain w neuropathy of LE: Continue home gabapentin  #Morbid Obesity: BMI 45. Complicates cares.    DVT Prophylaxis: Defer to primary service.  Started on lovenox.   Code Status: Full Code  Disposition: Per primary team. Pending therapy evals.      Marcos Lr MD    Reason for Consult   Reason for consult: Management of chronic medical conditions    Primary Care Physician   *Marlyn Garay    Chief Complaint   Right hip pain    History is obtained from the patient, patient's chart and d/w bedside nursing    History of Present Illness   Yuliet Gallego is a 50 year old female w PMH of OA, HTN who presents for planned admission for right BELKIS for chronic right hip OA.      Patient has chronic right hip pain related to osteoarthritis.  She has been followed by orthopedics.  She underwent right total hip arthroplasty on 3/8/2021.  Estimated blood loss was noted to be less than 100 cc.  No complications noted.  Patient denies any chest pain, shortness of breath, fevers or chills, abdominal pain, nausea or vomiting.  Postoperative pain under decent control.  She is  planning to work with therapy later this afternoon.    Past Medical History    I have reviewed this patient's medical history and updated it with pertinent information if needed.   Past Medical History:   Diagnosis Date     Arthritis     hip, back, shoulder     Hypertension      Irregular heart beat      Other chronic pain     back, rt hip into leg     PONV (postoperative nausea and vomiting)     with rotator cuff surgery       Past Surgical History   I have reviewed this patient's surgical history and updated it with pertinent information if needed.  Past Surgical History:   Procedure Laterality Date     APPENDECTOMY       CHOLECYSTECTOMY       DECOMPRESS DISC RF LUMBAR       ENT SURGERY      wisdom teeth removal     FUSION CERVICAL ANTERIOR, POSTERIOR THREE+ LEVELS, COMBINED       FUSION SPINE POSTERIOR ONE LEVEL       HEMILAMINECTOMY, DISCECTOMY LUMBAR ONE LEVEL, COMBINED      x2     ROTATOR CUFF REPAIR RT/LT Right        Prior to Admission Medications   Prior to Admission Medications   Prescriptions Last Dose Informant Patient Reported? Taking?   Cholecalciferol (VITAMIN D3) 50 MCG (2000 UT) CAPS 3/7/2021 at Unknown time  Yes Yes   Sig: Take 2,000 Int'l Units by mouth daily   Cranberry 500 MG CAPS 3/7/2021 at Unknown time  Yes Yes   Sig: Take 1 capsule by mouth daily   Omega-3 Fatty Acids (FISH OIL) 1200 MG capsule 3/2/2021  Yes Yes   Sig: Take 1,200 mg by mouth daily   acetaminophen (TYLENOL) 500 MG tablet 3/7/2021 at Unknown time  Yes Yes   Sig: Take 1,000 mg by mouth every 6 hours as needed for mild pain   calcium carbonate 600 mg-vitamin D 400 units (CALTRATE) 600-400 MG-UNIT per tablet 3/7/2021 at Unknown time  Yes Yes   Sig: Take 1 tablet by mouth daily   gabapentin (NEURONTIN) 300 MG capsule 3/7/2021 at Unknown time  Yes Yes   Sig: Take 600 mg by mouth daily   lactobacillus rhamnosus, GG, (CULTURELL) capsule   Yes Yes   Sig: Take 1 capsule by mouth daily (with dinner)   loratadine (CLARITIN) 10 MG tablet  3/7/2021 at Unknown time  Yes Yes   Sig: Take 10 mg by mouth daily (with dinner)   magnesium oxide 400 MG CAPS 3/7/2021 at Unknown time  Yes Yes   Sig: Take 1 capsule by mouth At Bedtime   metoprolol succinate ER (TOPROL-XL) 25 MG 24 hr tablet 3/7/2021 at Unknown time  Yes Yes   Sig: Take 25 mg by mouth daily (with dinner)   naproxen sodium (ANAPROX) 220 MG tablet 3/4/2021  Yes Yes   Sig: Take 220 mg by mouth 2 times daily (with meals)   simvastatin (ZOCOR) 20 MG tablet 3/7/2021 at Unknown time  Yes Yes   Sig: Take 20 mg by mouth At Bedtime      Facility-Administered Medications: None     Allergies   Allergies   Allergen Reactions     Adhesive Tape Blisters     Bees Swelling     Sulfa Drugs Hives     fainting       Social History   I have reviewed this patient's social history and updated it with pertinent information if needed. Yuliet Gallego  reports that she quit smoking about 20 years ago. Her smoking use included cigarettes. She quit after 14.00 years of use. She has never used smokeless tobacco. She reports current alcohol use. She reports that she does not use drugs.    Family History   I have reviewed this patient's family history and updated it with pertinent information if needed.   History reviewed. No pertinent family history.    Review of Systems   The 10 point Review of Systems is negative other than noted in the HPI or here.     Physical Exam   Temp: 97.6  F (36.4  C) Temp src: Temporal BP: 137/54 Pulse: 60   Resp: 16 SpO2: 100 % O2 Device: Nasal cannula Oxygen Delivery: 2 LPM  Vital Signs with Ranges  Temp:  [95.9  F (35.5  C)-98.3  F (36.8  C)] 97.6  F (36.4  C)  Pulse:  [55-78] 60  Resp:  [9-26] 16  BP: (113-151)/(54-82) 137/54  SpO2:  [99 %-100 %] 100 %  295 lbs 0 oz    Constitutional: NAD, Nontoxic. Sitting up in bed.   HEENT: Normocephalic, MMM, no elevation of JVD noted  Respiratory: Nl WOB, Clear bilaterally, No wheezes, no crackles  Cardiovascular: Regular, no murmur  GI: BS+, NT, ND, no  rebound or guarding  Lymph/Hematologic: No bruising. No cervical LAD  Skin: No rash  Musculoskeletal: Nl Tone, No edema noted. Surgical dressings in place. Appear clean. Sensation intact distal extremities. Able to wiggle all toes. Pulses intact.   Neurologic: A&Ox3, Answers appropriately. CNII-XII intact. Moves all extremities. No tremor  Psychiatric: Calm    Data   -Data reviewed today: All pertinent laboratory and imaging results from this encounter were reviewed.   No lab results found in last 7 days.    Recent Results (from the past 24 hour(s))   XR Hip Port Right 1 View    Narrative    This exam was marked as non-reportable because it will not be read by a   radiologist or a [place] non-radiologist provider.         XR Pelvis w Hip Right 1 View    Narrative    PELVIS AND RIGHT HIP, ONE VIEW   3/8/2021 1:09 PM     HISTORY: Status post hip surgery.    COMPARISON: None.      Impression    IMPRESSION: Right total hip arthroplasty in place. No evidence of  complication. Previous instrumented lumbar fusion.

## 2021-03-08 NOTE — ANESTHESIA PROCEDURE NOTES
Airway   Date/Time: 3/8/2021 10:00 AM   Patient location during procedure: OR  Staff -   CRNA: Radha Coelho APRN CRNA  Performed By: CRNA    Consent for Airway   Urgency: elective    Indications and Patient Condition  Indications for airway management: luis-procedural  Induction type:intravenousMask difficulty assessment: 1 - vent by mask    Final Airway Details  Final airway type: endotracheal airway  Successful airway:ETT - single and Oral  Endotracheal Airway Details   ETT size (mm): 7.0  Cuffed: yes  Successful intubation technique: direct laryngoscopy  Grade View of Cords: 2  Adjucts: stylet  Measured from: gums/teeth  Secured at (cm): 24  Secured with: other (comment) (silicon)  Bite block used: Soft    Post intubation assessment   Placement verified by: capnometry, equal breath sounds and chest rise   Number of attempts at approach: 1  Number of other approaches attempted: shoulder ramp  head lift.  Secured with:other (comment) (silicon)  Ease of procedure: easy  Dentition: Intact and Unchanged

## 2021-03-08 NOTE — ANESTHESIA CARE TRANSFER NOTE
Patient: Yuliet Gallego    Procedure(s):  Right total hip arthroplasty    Diagnosis: DJD (degenerative joint disease) [M19.90]  Diagnosis Additional Information: No value filed.    Anesthesia Type:   General     Note:    Oropharynx: nasal airway in place  Level of Consciousness: awake  Oxygen Supplementation: face mask  Level of Supplemental Oxygen (L/min / FiO2): 4  Independent Airway: airway patency satisfactory and stable  Dentition: dentition unchanged  Vital Signs Stable: post-procedure vital signs reviewed and stable  Report to RN Given: handoff report given  Patient transferred to: PACU    Handoff Report: Identifed the Patient, Identified the Reponsible Provider, Reviewed the pertinent medical history, Discussed the surgical course, Reviewed Intra-OP anesthesia mangement and issues during anesthesia, Set expectations for post-procedure period and Allowed opportunity for questions and acknowledgement of understanding      Vitals: (Last set prior to Anesthesia Care Transfer)  CRNA VITALS  3/8/2021 1209 - 3/8/2021 1247      3/8/2021             SpO2:  98 %    EKG:  Sinus rhythm        Electronically Signed By: RAJINDER Mena CRNA  March 8, 2021  12:47 PM

## 2021-03-08 NOTE — ANESTHESIA POSTPROCEDURE EVALUATION
Patient: Yuliet Gallego    Procedure(s):  Right total hip arthroplasty    Diagnosis:DJD (degenerative joint disease) [M19.90]  Diagnosis Additional Information: No value filed.    Anesthesia Type:  General    Note:  Disposition: Inpatient   Postop Pain Control: Uneventful            Sign Out: Well controlled pain   PONV: No   Neuro/Psych: Uneventful            Sign Out: Acceptable/Baseline neuro status   Airway/Respiratory: Uneventful            Sign Out: AIRWAY IN SITU/Resp. Support   CV/Hemodynamics: Uneventful            Sign Out: Acceptable CV status   Other NRE: NONE   DID A NON-ROUTINE EVENT OCCUR? No         Last vitals:  Vitals:    03/08/21 0841   BP: (!) 151/82   Pulse: 72   Resp: 20   Temp: 98.3  F (36.8  C)   SpO2: 100%       Last vitals prior to Anesthesia Care Transfer:  CRNA VITALS  3/8/2021 1041 - 3/8/2021 1111      3/8/2021             NIBP:  98/67    Pulse:  67    NIBP Mean:  75    Temp:  96.3  F (35.7  C)    SpO2:  97 %    Resp Rate (observed):  (!) 7          Electronically Signed By: Martín Schaeffer MD  March 8, 2021  11:11 AM

## 2021-03-09 ENCOUNTER — APPOINTMENT (OUTPATIENT)
Dept: PHYSICAL THERAPY | Facility: CLINIC | Age: 51
End: 2021-03-09
Attending: ORTHOPAEDIC SURGERY
Payer: COMMERCIAL

## 2021-03-09 VITALS
SYSTOLIC BLOOD PRESSURE: 133 MMHG | BODY MASS INDEX: 44.41 KG/M2 | RESPIRATION RATE: 16 BRPM | HEIGHT: 68 IN | HEART RATE: 74 BPM | TEMPERATURE: 98.3 F | WEIGHT: 293 LBS | OXYGEN SATURATION: 98 % | DIASTOLIC BLOOD PRESSURE: 50 MMHG

## 2021-03-09 LAB
ABO + RH BLD: NORMAL
ABO + RH BLD: NORMAL
ALBUMIN SERPL-MCNC: 2.9 G/DL (ref 3.4–5)
ALP SERPL-CCNC: 52 U/L (ref 40–150)
ALT SERPL W P-5'-P-CCNC: 21 U/L (ref 0–50)
ANION GAP SERPL CALCULATED.3IONS-SCNC: 7 MMOL/L (ref 3–14)
AST SERPL W P-5'-P-CCNC: 45 U/L (ref 0–45)
BASOPHILS # BLD AUTO: 0 10E9/L (ref 0–0.2)
BASOPHILS NFR BLD AUTO: 0.2 %
BILIRUB SERPL-MCNC: 0.3 MG/DL (ref 0.2–1.3)
BLD GP AB SCN SERPL QL: NORMAL
BLD PROD TYP BPU: NORMAL
BLD UNIT ID BPU: 0
BLOOD BANK CMNT PATIENT-IMP: NORMAL
BLOOD PRODUCT CODE: NORMAL
BPU ID: NORMAL
BUN SERPL-MCNC: 12 MG/DL (ref 7–30)
CALCIUM SERPL-MCNC: 8.2 MG/DL (ref 8.5–10.1)
CHLORIDE SERPL-SCNC: 104 MMOL/L (ref 94–109)
CO2 SERPL-SCNC: 26 MMOL/L (ref 20–32)
CREAT SERPL-MCNC: 0.77 MG/DL (ref 0.52–1.04)
DIFFERENTIAL METHOD BLD: ABNORMAL
EOSINOPHIL # BLD AUTO: 0 10E9/L (ref 0–0.7)
EOSINOPHIL NFR BLD AUTO: 0 %
ERYTHROCYTE [DISTWIDTH] IN BLOOD BY AUTOMATED COUNT: 14.3 % (ref 10–15)
FOLATE SERPL-MCNC: 12.6 NG/ML
GFR SERPL CREATININE-BSD FRML MDRD: 89 ML/MIN/{1.73_M2}
GLUCOSE SERPL-MCNC: 104 MG/DL (ref 70–99)
GLUCOSE SERPL-MCNC: 129 MG/DL (ref 70–99)
HCT VFR BLD AUTO: 34.9 % (ref 35–47)
HGB BLD-MCNC: 10.4 G/DL (ref 11.7–15.7)
HGB BLD-MCNC: 10.8 G/DL (ref 11.7–15.7)
HGB BLD-MCNC: 11.8 G/DL (ref 11.7–15.7)
IMM GRANULOCYTES # BLD: 0 10E9/L (ref 0–0.4)
IMM GRANULOCYTES NFR BLD: 0.4 %
LDH SERPL L TO P-CCNC: 240 U/L (ref 81–234)
LYMPHOCYTES # BLD AUTO: 0.7 10E9/L (ref 0.8–5.3)
LYMPHOCYTES NFR BLD AUTO: 6.4 %
MCH RBC QN AUTO: 29.1 PG (ref 26.5–33)
MCHC RBC AUTO-ENTMCNC: 29.8 G/DL (ref 31.5–36.5)
MCV RBC AUTO: 98 FL (ref 78–100)
MONOCYTES # BLD AUTO: 1.5 10E9/L (ref 0–1.3)
MONOCYTES NFR BLD AUTO: 13.3 %
NEUTROPHILS # BLD AUTO: 8.9 10E9/L (ref 1.6–8.3)
NEUTROPHILS NFR BLD AUTO: 79.7 %
NRBC # BLD AUTO: 0 10*3/UL
NRBC BLD AUTO-RTO: 0 /100
PLATELET # BLD AUTO: 12 10E9/L (ref 150–450)
PLATELET # BLD AUTO: 188 10E9/L (ref 150–450)
POTASSIUM SERPL-SCNC: 3.8 MMOL/L (ref 3.4–5.3)
PROT SERPL-MCNC: 6.3 G/DL (ref 6.8–8.8)
RBC # BLD AUTO: 3.58 10E12/L (ref 3.8–5.2)
RETICS # AUTO: 56.6 10E9/L (ref 25–95)
RETICS/RBC NFR AUTO: 1.6 % (ref 0.5–2)
SODIUM SERPL-SCNC: 137 MMOL/L (ref 133–144)
SPECIMEN EXP DATE BLD: NORMAL
TRANSFUSION STATUS PATIENT QL: NORMAL
TRANSFUSION STATUS PATIENT QL: NORMAL
VIT B12 SERPL-MCNC: 272 PG/ML (ref 193–986)
WBC # BLD AUTO: 11.2 10E9/L (ref 4–11)

## 2021-03-09 PROCEDURE — 85025 COMPLETE CBC W/AUTO DIFF WBC: CPT | Performed by: HOSPITALIST

## 2021-03-09 PROCEDURE — 250N000013 HC RX MED GY IP 250 OP 250 PS 637: Performed by: HOSPITALIST

## 2021-03-09 PROCEDURE — 80053 COMPREHEN METABOLIC PANEL: CPT | Performed by: HOSPITALIST

## 2021-03-09 PROCEDURE — 86901 BLOOD TYPING SEROLOGIC RH(D): CPT | Performed by: HOSPITALIST

## 2021-03-09 PROCEDURE — 97530 THERAPEUTIC ACTIVITIES: CPT | Mod: GP | Performed by: PHYSICAL THERAPIST

## 2021-03-09 PROCEDURE — 258N000003 HC RX IP 258 OP 636: Performed by: HOSPITALIST

## 2021-03-09 PROCEDURE — 250N000011 HC RX IP 250 OP 636: Performed by: ORTHOPAEDIC SURGERY

## 2021-03-09 PROCEDURE — 83010 ASSAY OF HAPTOGLOBIN QUANT: CPT | Performed by: ORTHOPAEDIC SURGERY

## 2021-03-09 PROCEDURE — 250N000013 HC RX MED GY IP 250 OP 250 PS 637: Performed by: ORTHOPAEDIC SURGERY

## 2021-03-09 PROCEDURE — 999N000127 HC STATISTIC PERIPHERAL IV START W US GUIDANCE

## 2021-03-09 PROCEDURE — 999N001109 HC STATISTIC MORPHOLOGY W/INTERP HISTOLOGY TC 85060: Performed by: HOSPITALIST

## 2021-03-09 PROCEDURE — 85018 HEMOGLOBIN: CPT | Mod: 91 | Performed by: ORTHOPAEDIC SURGERY

## 2021-03-09 PROCEDURE — 97116 GAIT TRAINING THERAPY: CPT | Mod: GP | Performed by: PHYSICAL THERAPIST

## 2021-03-09 PROCEDURE — 93010 ELECTROCARDIOGRAM REPORT: CPT | Performed by: INTERNAL MEDICINE

## 2021-03-09 PROCEDURE — 86850 RBC ANTIBODY SCREEN: CPT | Performed by: HOSPITALIST

## 2021-03-09 PROCEDURE — 85049 AUTOMATED PLATELET COUNT: CPT | Performed by: ORTHOPAEDIC SURGERY

## 2021-03-09 PROCEDURE — 83615 LACTATE (LD) (LDH) ENZYME: CPT | Performed by: HOSPITALIST

## 2021-03-09 PROCEDURE — 99207 PR CDG-CODE CATEGORY CHANGED: CPT | Performed by: HOSPITALIST

## 2021-03-09 PROCEDURE — 82746 ASSAY OF FOLIC ACID SERUM: CPT | Performed by: HOSPITALIST

## 2021-03-09 PROCEDURE — 99213 OFFICE O/P EST LOW 20 MIN: CPT | Performed by: HOSPITALIST

## 2021-03-09 PROCEDURE — 36415 COLL VENOUS BLD VENIPUNCTURE: CPT | Performed by: ORTHOPAEDIC SURGERY

## 2021-03-09 PROCEDURE — 85018 HEMOGLOBIN: CPT | Mod: 91 | Performed by: HOSPITALIST

## 2021-03-09 PROCEDURE — 86900 BLOOD TYPING SEROLOGIC ABO: CPT | Performed by: HOSPITALIST

## 2021-03-09 PROCEDURE — 36415 COLL VENOUS BLD VENIPUNCTURE: CPT | Performed by: HOSPITALIST

## 2021-03-09 PROCEDURE — 85060 BLOOD SMEAR INTERPRETATION: CPT | Mod: 26 | Performed by: PATHOLOGY

## 2021-03-09 PROCEDURE — 85045 AUTOMATED RETICULOCYTE COUNT: CPT | Performed by: HOSPITALIST

## 2021-03-09 PROCEDURE — 82947 ASSAY GLUCOSE BLOOD QUANT: CPT | Performed by: ORTHOPAEDIC SURGERY

## 2021-03-09 PROCEDURE — 96372 THER/PROPH/DIAG INJ SC/IM: CPT | Performed by: ORTHOPAEDIC SURGERY

## 2021-03-09 PROCEDURE — 82607 VITAMIN B-12: CPT | Performed by: HOSPITALIST

## 2021-03-09 RX ORDER — CEPHALEXIN 500 MG/1
500 CAPSULE ORAL 4 TIMES DAILY
Qty: 40 CAPSULE | Refills: 0 | Status: SHIPPED | OUTPATIENT
Start: 2021-03-09 | End: 2023-10-25

## 2021-03-09 RX ADMIN — OXYCODONE HYDROCHLORIDE 10 MG: 5 TABLET ORAL at 17:28

## 2021-03-09 RX ADMIN — ACETAMINOPHEN 975 MG: 325 TABLET, FILM COATED ORAL at 07:28

## 2021-03-09 RX ADMIN — OXYCODONE HYDROCHLORIDE 10 MG: 5 TABLET ORAL at 07:28

## 2021-03-09 RX ADMIN — ENOXAPARIN SODIUM 40 MG: 40 INJECTION SUBCUTANEOUS at 07:29

## 2021-03-09 RX ADMIN — GABAPENTIN 600 MG: 300 CAPSULE ORAL at 07:28

## 2021-03-09 RX ADMIN — ACETAMINOPHEN 975 MG: 325 TABLET, FILM COATED ORAL at 16:07

## 2021-03-09 RX ADMIN — DOCUSATE SODIUM 50 MG AND SENNOSIDES 8.6 MG 1 TABLET: 8.6; 5 TABLET, FILM COATED ORAL at 07:27

## 2021-03-09 RX ADMIN — OXYCODONE HYDROCHLORIDE 5 MG: 5 TABLET ORAL at 02:46

## 2021-03-09 RX ADMIN — OXYCODONE HYDROCHLORIDE 10 MG: 5 TABLET ORAL at 12:45

## 2021-03-09 RX ADMIN — IBUPROFEN 600 MG: 600 TABLET, FILM COATED ORAL at 11:29

## 2021-03-09 RX ADMIN — HYDROXYZINE HYDROCHLORIDE 25 MG: 25 TABLET, FILM COATED ORAL at 06:57

## 2021-03-09 RX ADMIN — FAMOTIDINE 20 MG: 20 TABLET ORAL at 07:31

## 2021-03-09 RX ADMIN — ACETAMINOPHEN 975 MG: 325 TABLET, FILM COATED ORAL at 00:14

## 2021-03-09 RX ADMIN — DOCUSATE SODIUM 100 MG: 100 CAPSULE, LIQUID FILLED ORAL at 07:28

## 2021-03-09 RX ADMIN — POLYETHYLENE GLYCOL 3350 17 G: 17 POWDER, FOR SOLUTION ORAL at 07:26

## 2021-03-09 RX ADMIN — CEFAZOLIN SODIUM 2 G: 2 INJECTION, SOLUTION INTRAVENOUS at 02:14

## 2021-03-09 RX ADMIN — SODIUM CHLORIDE 1000 ML: 9 INJECTION, SOLUTION INTRAVENOUS at 08:12

## 2021-03-09 NOTE — PROGRESS NOTES
Patient vital signs are at baseline: Yes  Patient able to ambulate as they were prior to admission or with assist devices provided by therapies during their stay:  Yes  Patient MUST void prior to discharge:  Yes  Patient able to tolerate oral intake:  No,  Reason:  Pt had nausea on evening shift   Pain has adequate pain control using Oral analgesics:  Yes     Pt A/O x 4, VSS, afebrile, on RA. LS clear, denies SOB. Up A1, GB, walker, ambulated to bathroom. Regular diet, pt had nausea on pm shift. Voiding in adequate amounts. Aquacel, CDI, ice applied. Strong dorsi/planter, +2pp, baseline tingling to top of R foot. Pain rated 5/10, managed w/Oxy 10mg. Plan is to discharge home today w/.

## 2021-03-09 NOTE — PLAN OF CARE
Patient vital signs are at baseline: Yes, on room air with capnography.  Patient able to ambulate as they were prior to admission or with assist devices provided by therapies during their stay:  Yes, ambulated with walker, gait belt and assist of 1.  Patient MUST void prior to discharge:  Yes, voiding in good amounts.  Patient able to tolerate oral intake:  Yes, tolerated diet, but c/o nausea, given iv zofran with relief.  Pain has adequate pain control using Oral analgesics:  Yes.  Pain controlled with oxycodone, tylenol, vistaril and ibuprofen, ice pack application.  Care plan reviewed with pt.

## 2021-03-09 NOTE — PROVIDER NOTIFICATION
0734: vital signs done, medications given. Pt A&O x 4, pain a 4 and oxycodone given. Pt has been un in the chair for about 20 minutes prior to this RN entering the room without any issues.   After medications and using IS pt c/o dizziness, eyes open, started only speaking a few words. BP 74/36. Dr. Lr paged to come see pt STAT. MD at bedside right away- RN and support staff were getting pt back to bed. Bolus started. Plts came back at 12- orders for prepare and transfuse. Blood consent signed with MD and nurse at bedside     0820: Dr. Zamudio's SHAYAN Wolf was updated on pt status.

## 2021-03-09 NOTE — PROGRESS NOTES
[facility]    Hospitalist Progress Note      Assessment & Plan   Yuliet Gallego is a 50 year old female w PMH of OA, HTN who presents for planned admission for right BELKIS for chronic right hip OA.   Hospital medicine was consulted for management of chronic medical conditions.     #Right Hip OA s/p right BELKIS on 03/08: Estimated blood loss was noted to be less than 100 cc.  No complications noted.  Patient denies any chest pain, shortness of breath, fevers or chills, abdominal pain, nausea or vomiting.  Postoperative pain under decent control.  Pre-operative Hgb 13.8 in care everywhere on 02/26/2021.   -Postoperative cares including pain management, DVT prophylaxis, diet, activity per primary team.    #Hypotension: Patient with systolic blood pressures in the 70s to 80s this AM.  This was in the context of her getting in the chair to eat breakfast.  She denies any chest pain or shortness of breath.  She did have some lightheadedness.  She was given IV fluids with improvement in blood pressures to the normal range.    -I suspect she was probably volume depleted in the setting of postoperative period.  Hemoglobin stable.  Surgical dressings are clean.  EKG was obtained that showed sinus rhythm without acute ST segment elevations or depressions.  She also denies any chest pain or chest discomfort.  She is afebrile.  -Monitor through afternoon. Encourage PO intake. If Pt does well this afternoon then can discharge.  Will need to work with PT again prior to discharge.  -I did recommend holding metoprolol for next 3 days.  Check BP's at home. If systolic BP's over 120 then would resume low dose metoprolol.  Explained to patient and  who both voiced understanding.      #Thrombocytopenia: Platelet count was noted to be 12 this AM.  Hemoglobin stable.  Recheck platelet count was normal.  This was likely secondary to platelet clumping/lab error.  Discussed with hematology.     Chronic Medical  Conditions  #HTN: Hold metoprolol for a few days given soft BP's.  Monitor BP's on discharge.    #HL: Continue home statin  #Chronic Pain w neuropathy of LE: Continue home gabapentin  #Morbid Obesity: BMI 45. Complicates cares.     DVT Prophylaxis: Defer to primary service.  Given lovenox this AM. Will be on xarelto on discharge.   Code Status: Full Code  Disposition: Per primary team. Pending therapy evals.  Possibly later today vs tomorrow.     Marcos Lr MD  Text Page    Interval History   Patient with lightheadedness and hypotension this AM.  In the setting of being in the chair from the bed.  She denies any associated chest pain or shortness of breath.  No loss of consciousness.  No nausea or vomiting.  Surgical site is clean.  Hemoglobin is stable.  Blood pressures improved with IV fluids and she does feel back to baseline.    -Data reviewed today: I reviewed all new labs and imaging results over the last 24 hours.     Physical Exam   Temp: 96.8  F (36  C) Temp src: Temporal BP: 118/70 Pulse: 68   Resp: 18 SpO2: 97 % O2 Device: None (Room air) Oxygen Delivery: 2 LPM  Vitals:    02/23/21 1602 03/04/21 1113 03/08/21 0821   Weight: 134.7 kg (297 lb) 135 kg (297 lb 9.9 oz) 133.8 kg (295 lb)     Vital Signs with Ranges  Temp:  [95.9  F (35.5  C)-99.5  F (37.5  C)] 96.8  F (36  C)  Pulse:  [55-87] 68  Resp:  [9-26] 18  BP: ()/(35-79) 118/70  SpO2:  [93 %-100 %] 97 %  I/O last 3 completed shifts:  In: 3382 [P.O.:800; I.V.:2582]  Out: 750 [Urine:650; Blood:100]    Constitutional: Nontoxic, NAD  HEENT: Normocephalic. MMM, No elevation of JVD noted.   Respiratory: Nl WOB, Clear bilaterally, No wheezes or crackles  Cardiovascular: Regular, no murmur  GI: BS+, NT, ND  Skin/Integumen: WWP, no rash. No edema. Surgical dressings clean. No bleeding.    Neuro: CNII-XII intact. Moves all extremities. No tremor. A&Ox3.    Medications     lactated ringers 100 mL/hr at 03/08/21 1847       acetaminophen  975 mg Oral Q8H      docusate sodium  100 mg Oral BID     famotidine  20 mg Oral BID    Or     famotidine  20 mg Intravenous BID     gabapentin  600 mg Oral Daily     magnesium oxide  400 mg Oral At Bedtime     polyethylene glycol  17 g Oral Daily     senna-docusate  1 tablet Oral BID     simvastatin  20 mg Oral At Bedtime     sodium chloride (PF)  3 mL Intracatheter Q8H       Data   Recent Labs   Lab 03/09/21  1017 03/09/21  0614 03/08/21  1528   WBC 11.2*  --   --    HGB 10.4* 11.8  --    MCV 98  --   --     12*  --      --   --    POTASSIUM 3.8  --   --    CHLORIDE 104  --   --    CO2 26  --   --    BUN 12  --   --    CR 0.77  --  0.68   ANIONGAP 7  --   --    LOTUS 8.2*  --   --    * 104*  --    ALBUMIN 2.9*  --   --    PROTTOTAL 6.3*  --   --    BILITOTAL 0.3  --   --    ALKPHOS 52  --   --    ALT 21  --   --    AST 45  --   --        Recent Results (from the past 24 hour(s))   XR Hip Port Right 1 View    Narrative    This exam was marked as non-reportable because it will not be read by a   radiologist or a [place] non-radiologist provider.         XR Pelvis w Hip Right 1 View    Narrative    PELVIS AND RIGHT HIP, ONE VIEW   3/8/2021 1:09 PM     HISTORY: Status post hip surgery.    COMPARISON: None.      Impression    IMPRESSION: Right total hip arthroplasty in place. No evidence of  complication. Previous instrumented lumbar fusion.    DEREK CELESTIN MD

## 2021-03-09 NOTE — PLAN OF CARE
Patient vital signs are at baseline: Yes  Patient able to ambulate as they were prior to admission or with assist devices provided by therapies during their stay:  No,  Reason:  PT to see this afternoon  Patient MUST void prior to discharge:  Yes  Patient able to tolerate oral intake:  Yes  Pain has adequate pain control using Oral analgesics:  Yes

## 2021-03-09 NOTE — PLAN OF CARE
OT: Orders received. Chart reviewed and discussed with care team.  OT not indicated as pt with history of multiple spine surgeries, familiar with DME for ADL completion and has supportive spouse able to assist with ADLs/IADLs as needed upon return home.  Defer discharge recommendations to PT/care team.  Will complete IP OT orders.

## 2021-03-09 NOTE — PLAN OF CARE
PT: Chart reviewed and discussed with RN this AM. Not medically appropriate for AM PT session due to low platelet count and low BPs.

## 2021-03-09 NOTE — CONSULTS
[facility]    Hematology / Oncology Consultation     Date of Admission:  3/8/2021    Assessment & Plan   Yuliet Gallego is a 50 year old female who was admitted on 3/8/2021. I was asked to see the patient for thrombocytopenia.     Assessment:  Plan:  1.  Thrombocytopenia:   -Patient has normal CBC at the time of preop assessment 1 month ago platelets were 208 at the time.  -Had right hip replacement surgery yesterday.  There was no bleeding complications during the surgery.   -CBC on postoperative day 1 showed hemoglobin 11.8 platelets 12.   -Patient does not have any history of previous heparin exposure, she did receive Lovenox on postoperative day 1 but that was administered after the CBC was drawn.  Other medications include antibiotics.   -Patient denies any previous history of bleeding diastasis, gum bleeding oozing from her incision site or purpura.  -Repeat CBC done using a green top tube showed hemoglobin 11.2 platelets 188.  Peripheral smear is pending>     Plan  -In retrospect it appears that the low platelet is likely lab error, possibly clumping of platelets associated with EDTA tube.   -Await peripheral smear report.  If clumping is confirmed future CBC should be drawn using green top tube.         Jonathan Romero MD   [facility]  Office:  [phone]    Code Status    Full Code    Reason for Consult   Reason for consult: Thrombocytopenia    Primary Care Physician   Marlyn Garay    Chief Complaint   Thrombocytopenia.       History of Present Illness   Yuliet Gallego is a 50 year old female who presents postoperative day 2 after right hip replacement surgery.   Patient has normal CBC at the time of preop assessment 1 month ago platelets were 208 at the time.  -Had right hip replacement surgery yesterday.  There was no bleeding complications during the surgery.   -CBC on postoperative day 1 showed hemoglobin 11.8 platelets 12.   -Patient does not have any history of previous heparin exposure, she  did receive Lovenox on postoperative day 1 but that was administered after the CBC was drawn.  Other medications include antibiotics.   -Patient denies any previous history of bleeding diastasis, gum bleeding oozing from her incision site or purpura.  Patient denies any history of liver disease does not smoke does not drink.  No previous history of blood disorders.       Past Medical History   I have reviewed this patient's medical history and updated it with pertinent information if needed.   Past Medical History:   Diagnosis Date     Arthritis     hip, back, shoulder     Hypertension      Irregular heart beat      Other chronic pain     back, rt hip into leg     PONV (postoperative nausea and vomiting)     with rotator cuff surgery       Past Surgical History   I have reviewed this patient's surgical history and updated it with pertinent information if needed.  Past Surgical History:   Procedure Laterality Date     APPENDECTOMY       CHOLECYSTECTOMY       DECOMPRESS DISC RF LUMBAR       ENT SURGERY      wisdom teeth removal     FUSION CERVICAL ANTERIOR, POSTERIOR THREE+ LEVELS, COMBINED       FUSION SPINE POSTERIOR ONE LEVEL       HEMILAMINECTOMY, DISCECTOMY LUMBAR ONE LEVEL, COMBINED      x2     ROTATOR CUFF REPAIR RT/LT Right        Prior to Admission Medications   Prior to Admission Medications   Prescriptions Last Dose Informant Patient Reported? Taking?   Cholecalciferol (VITAMIN D3) 50 MCG (2000 UT) CAPS 3/7/2021 at Unknown time  Yes Yes   Sig: Take 2,000 Int'l Units by mouth daily   Cranberry 500 MG CAPS 3/7/2021 at Unknown time  Yes Yes   Sig: Take 1 capsule by mouth daily   Omega-3 Fatty Acids (FISH OIL) 1200 MG capsule 3/2/2021  Yes Yes   Sig: Take 1,200 mg by mouth daily   acetaminophen (TYLENOL) 500 MG tablet 3/7/2021 at Unknown time  Yes Yes   Sig: Take 1,000 mg by mouth every 6 hours as needed for mild pain   calcium carbonate 600 mg-vitamin D 400 units (CALTRATE) 600-400 MG-UNIT per tablet 3/7/2021  at Unknown time  Yes Yes   Sig: Take 1 tablet by mouth daily   gabapentin (NEURONTIN) 300 MG capsule 3/7/2021 at Unknown time  Yes Yes   Sig: Take 600 mg by mouth daily   lactobacillus rhamnosus, GG, (CULTURELL) capsule   Yes Yes   Sig: Take 1 capsule by mouth daily (with dinner)   loratadine (CLARITIN) 10 MG tablet 3/7/2021 at Unknown time  Yes Yes   Sig: Take 10 mg by mouth daily (with dinner)   magnesium oxide 400 MG CAPS 3/7/2021 at Unknown time  Yes Yes   Sig: Take 1 capsule by mouth At Bedtime   metoprolol succinate ER (TOPROL-XL) 25 MG 24 hr tablet 3/7/2021 at Unknown time  Yes No   Sig: Take 25 mg by mouth daily (with dinner)   naproxen sodium (ANAPROX) 220 MG tablet 3/4/2021  Yes Yes   Sig: Take 220 mg by mouth 2 times daily (with meals)   simvastatin (ZOCOR) 20 MG tablet 3/7/2021 at Unknown time  Yes Yes   Sig: Take 20 mg by mouth At Bedtime      Facility-Administered Medications: None     Allergies   Allergies   Allergen Reactions     Adhesive Tape Blisters     Bees Swelling     Sulfa Drugs Hives     fainting       Social History   I have reviewed this patient's social history and updated it with pertinent information if needed. Yuliet Gallego  reports that she quit smoking about 20 years ago. Her smoking use included cigarettes. She quit after 14.00 years of use. She has never used smokeless tobacco. She reports current alcohol use. She reports that she does not use drugs.    Family History   I have reviewed this patient's family history and updated it with pertinent information if needed.   History reviewed. No pertinent family history.    Review of Systems   Review of systems negative for any visible bleeding, purpura, shortness of breath cough abdominal pain.  Positive for mild headache.     Physical Exam   Temp: 96.8  F (36  C) Temp src: Temporal BP: 118/70 Pulse: 68   Resp: 18 SpO2: 97 % O2 Device: None (Room air) Oxygen Delivery: 2 LPM  Vital Signs with Ranges  Temp:  [95.9  F (35.5  C)-99.5  F (37.5   C)] 96.8  F (36  C)  Pulse:  [55-87] 68  Resp:  [9-26] 18  BP: ()/(35-79) 118/70  SpO2:  [93 %-100 %] 97 %  295 lbs 0 oz    Constitutional: Awake, alert, cooperative, no apparent distress. ECOG PS 2  Eyes: Conjunctiva and pupils examined and normal.  HEENT: Moist mucous membranes, normal dentition.  GI: Soft, non-distended, non-tender, normal bowel sounds.  Lymph/Hematologic: No anterior cervical or supraclavicular adenopathy.  Skin: No rashes, no cyanosis, no edema.  No purpura    Psychiatric: Alert, oriented to person, place and time, no obvious anxiety or depression.    Data   Recent laboratory studies reviewed and discussed with Dr. Lr

## 2021-03-09 NOTE — PROVIDER NOTIFICATION
07:51 critical lab value  Platelets are 12. MD and RN notified.  Dr Lr was on the floor and putting orders in.

## 2021-03-09 NOTE — DISCHARGE INSTRUCTIONS
"Dr Lr recommends \"do not take metoprolol for next 3 days.  Check BP's at home. If systolic (upper number) of your blood pressure is consistently greater than 120 then would resume metoprolol.\"  "

## 2021-03-09 NOTE — PROGRESS NOTES
"Orthopedic Surgery  3/9/2021    S: Patient voices no complaints today.     O: Blood pressure 130/68, pulse 78, temperature 97.2  F (36.2  C), temperature source Temporal, resp. rate 15, height 1.72 m (5' 7.72\"), weight 133.8 kg (295 lb), SpO2 97 %.  No results found for: HGB  No results found for: INR     Neurovascularly intact.  Calves are negative bilaterally, both soft and nontender.  The wound is C/D/I.  The wound looks good with minimal erythema of the surrounding skin.    A: Ms. Gallego is doing well status post Procedure(s):  Right total hip arthroplasty.    P: Continue physical therapy.   Anticoagulation home on Xarelto for 10 days then ASA  Due to patient's BMI will plan to send the patient home on 10 days of keflex  Pain management  Discharge planning    Toni Zamudio MD  [phone]    "

## 2021-03-09 NOTE — PLAN OF CARE
Physical Therapy Discharge Summary    Reason for therapy discharge:    All goals and outcomes met, no further needs identified.    Progress towards therapy goal(s). See goals on Care Plan in [facility] electronic health record for goal details.  Goals met    Therapy recommendation(s):    No further therapy is recommended.

## 2021-03-10 LAB — HAPTOGLOB SERPL-MCNC: 172 MG/DL (ref 32–197)

## 2021-03-10 NOTE — PLAN OF CARE
Patient's After Visit Summary was reviewed with patient and/or spouse.   Patient verbalized understanding of After Visit Summary, recommended follow up and was given an opportunity to ask questions.   Discharge medications sent home with patient/family: YES (oxycodone, senna, atarax, xarelto, aspirin)  Discharged with spouse      All belongings returned to patient at this time.    Pt given ortho stoplight tool at discharge.    Pt discharged with surgical dressing C/D/I.    Pt stated she already had a walker at home.    Pt discharged with oxycodone and Xarelto handouts.    OBSERVATION patient END time: 5210

## 2021-03-11 LAB — COPATH REPORT: NORMAL

## 2021-03-30 LAB — INTERPRETATION ECG - MUSE: NORMAL

## 2021-04-04 ENCOUNTER — HEALTH MAINTENANCE LETTER (OUTPATIENT)
Age: 51
End: 2021-04-04

## 2021-05-07 LAB — INTERPRETATION ECG - MUSE: NORMAL

## 2021-09-18 ENCOUNTER — HEALTH MAINTENANCE LETTER (OUTPATIENT)
Age: 51
End: 2021-09-18

## 2022-04-30 ENCOUNTER — HEALTH MAINTENANCE LETTER (OUTPATIENT)
Age: 52
End: 2022-04-30

## 2022-11-20 ENCOUNTER — HEALTH MAINTENANCE LETTER (OUTPATIENT)
Age: 52
End: 2022-11-20

## 2023-06-01 ENCOUNTER — HEALTH MAINTENANCE LETTER (OUTPATIENT)
Age: 53
End: 2023-06-01

## 2023-10-01 ENCOUNTER — RX ONLY (RX ONLY)
Age: 53
End: 2023-10-01

## 2023-10-25 RX ORDER — METOPROLOL SUCCINATE 50 MG/1
50 TABLET, EXTENDED RELEASE ORAL DAILY
COMMUNITY
Start: 2023-04-14

## 2023-10-25 RX ORDER — LOSARTAN POTASSIUM 50 MG/1
1 TABLET ORAL DAILY
COMMUNITY
Start: 2023-02-16

## 2023-10-25 RX ORDER — DULOXETIN HYDROCHLORIDE 30 MG/1
30 CAPSULE, DELAYED RELEASE ORAL 2 TIMES DAILY
COMMUNITY
Start: 2022-04-13

## 2023-10-25 RX ORDER — PSYLLIUM HUSK 3.4 G/5.79G
3.4 POWDER ORAL DAILY
COMMUNITY
Start: 2022-11-11

## 2023-11-01 ENCOUNTER — ANESTHESIA (OUTPATIENT)
Dept: SURGERY | Facility: CLINIC | Age: 53
End: 2023-11-01
Payer: COMMERCIAL

## 2023-11-01 ENCOUNTER — HOSPITAL ENCOUNTER (OUTPATIENT)
Facility: CLINIC | Age: 53
Discharge: HOME | End: 2023-11-01
Attending: ORTHOPAEDIC SURGERY | Admitting: ORTHOPAEDIC SURGERY
Payer: COMMERCIAL

## 2023-11-01 ENCOUNTER — ANESTHESIA EVENT (OUTPATIENT)
Dept: SURGERY | Facility: CLINIC | Age: 53
End: 2023-11-01
Payer: COMMERCIAL

## 2023-11-01 VITALS
WEIGHT: 293 LBS | TEMPERATURE: 98 F | OXYGEN SATURATION: 98 % | SYSTOLIC BLOOD PRESSURE: 123 MMHG | HEART RATE: 84 BPM | RESPIRATION RATE: 16 BRPM | HEIGHT: 68 IN | BODY MASS INDEX: 44.41 KG/M2 | DIASTOLIC BLOOD PRESSURE: 81 MMHG

## 2023-11-01 DIAGNOSIS — S86.311A PERONEAL TENDON TEAR, RIGHT, INITIAL ENCOUNTER: Primary | ICD-10-CM

## 2023-11-01 PROCEDURE — 258N000003 HC RX IP 258 OP 636: Performed by: ANESTHESIOLOGY

## 2023-11-01 PROCEDURE — 250N000011 HC RX IP 250 OP 636: Performed by: PHYSICIAN ASSISTANT

## 2023-11-01 PROCEDURE — 250N000013 HC RX MED GY IP 250 OP 250 PS 637: Performed by: PHYSICIAN ASSISTANT

## 2023-11-01 PROCEDURE — 258N000003 HC RX IP 258 OP 636

## 2023-11-01 PROCEDURE — 999N000141 HC STATISTIC PRE-PROCEDURE NURSING ASSESSMENT: Performed by: ORTHOPAEDIC SURGERY

## 2023-11-01 PROCEDURE — 250N000011 HC RX IP 250 OP 636: Performed by: ANESTHESIOLOGY

## 2023-11-01 PROCEDURE — 272N000001 HC OR GENERAL SUPPLY STERILE: Performed by: ORTHOPAEDIC SURGERY

## 2023-11-01 PROCEDURE — 710N000012 HC RECOVERY PHASE 2, PER MINUTE: Performed by: ORTHOPAEDIC SURGERY

## 2023-11-01 PROCEDURE — 370N000017 HC ANESTHESIA TECHNICAL FEE, PER MIN: Performed by: ORTHOPAEDIC SURGERY

## 2023-11-01 PROCEDURE — 250N000009 HC RX 250: Performed by: ORTHOPAEDIC SURGERY

## 2023-11-01 PROCEDURE — 360N000076 HC SURGERY LEVEL 3, PER MIN: Performed by: ORTHOPAEDIC SURGERY

## 2023-11-01 PROCEDURE — 250N000011 HC RX IP 250 OP 636: Performed by: ORTHOPAEDIC SURGERY

## 2023-11-01 PROCEDURE — 250N000009 HC RX 250: Performed by: ANESTHESIOLOGY

## 2023-11-01 PROCEDURE — 250N000013 HC RX MED GY IP 250 OP 250 PS 637: Performed by: ANESTHESIOLOGY

## 2023-11-01 PROCEDURE — 710N000009 HC RECOVERY PHASE 1, LEVEL 1, PER MIN: Performed by: ORTHOPAEDIC SURGERY

## 2023-11-01 PROCEDURE — 250N000025 HC SEVOFLURANE, PER MIN: Performed by: ORTHOPAEDIC SURGERY

## 2023-11-01 RX ORDER — MEPERIDINE HYDROCHLORIDE 25 MG/ML
12.5 INJECTION INTRAMUSCULAR; INTRAVENOUS; SUBCUTANEOUS EVERY 5 MIN PRN
Status: DISCONTINUED | OUTPATIENT
Start: 2023-11-01 | End: 2023-11-01 | Stop reason: HOSPADM

## 2023-11-01 RX ORDER — SODIUM CHLORIDE, SODIUM LACTATE, POTASSIUM CHLORIDE, CALCIUM CHLORIDE 600; 310; 30; 20 MG/100ML; MG/100ML; MG/100ML; MG/100ML
INJECTION, SOLUTION INTRAVENOUS CONTINUOUS
Status: DISCONTINUED | OUTPATIENT
Start: 2023-11-01 | End: 2023-11-01 | Stop reason: HOSPADM

## 2023-11-01 RX ORDER — HYDROMORPHONE HCL IN WATER/PF 6 MG/30 ML
0.4 PATIENT CONTROLLED ANALGESIA SYRINGE INTRAVENOUS EVERY 5 MIN PRN
Status: DISCONTINUED | OUTPATIENT
Start: 2023-11-01 | End: 2023-11-01 | Stop reason: HOSPADM

## 2023-11-01 RX ORDER — IBUPROFEN 800 MG/1
800 TABLET, FILM COATED ORAL EVERY 8 HOURS PRN
Qty: 50 TABLET | Refills: 0 | Status: SHIPPED | OUTPATIENT
Start: 2023-11-01

## 2023-11-01 RX ORDER — LIDOCAINE HYDROCHLORIDE 20 MG/ML
INJECTION, SOLUTION INFILTRATION; PERINEURAL PRN
Status: DISCONTINUED | OUTPATIENT
Start: 2023-11-01 | End: 2023-11-01

## 2023-11-01 RX ORDER — DIAZEPAM 10 MG/2ML
2.5 INJECTION, SOLUTION INTRAMUSCULAR; INTRAVENOUS
Status: DISCONTINUED | OUTPATIENT
Start: 2023-11-01 | End: 2023-11-01 | Stop reason: HOSPADM

## 2023-11-01 RX ORDER — ALBUTEROL SULFATE 0.83 MG/ML
2.5 SOLUTION RESPIRATORY (INHALATION) EVERY 4 HOURS PRN
Status: DISCONTINUED | OUTPATIENT
Start: 2023-11-01 | End: 2023-11-01 | Stop reason: HOSPADM

## 2023-11-01 RX ORDER — BUPIVACAINE HYDROCHLORIDE 5 MG/ML
INJECTION, SOLUTION EPIDURAL; INTRACAUDAL
Status: COMPLETED | OUTPATIENT
Start: 2023-11-01 | End: 2023-11-01

## 2023-11-01 RX ORDER — FENTANYL CITRATE 50 UG/ML
25 INJECTION, SOLUTION INTRAMUSCULAR; INTRAVENOUS EVERY 5 MIN PRN
Status: DISCONTINUED | OUTPATIENT
Start: 2023-11-01 | End: 2023-11-01 | Stop reason: HOSPADM

## 2023-11-01 RX ORDER — CEFAZOLIN SODIUM/WATER 3 G/30 ML
3 SYRINGE (ML) INTRAVENOUS SEE ADMIN INSTRUCTIONS
Status: DISCONTINUED | OUTPATIENT
Start: 2023-11-01 | End: 2023-11-01 | Stop reason: HOSPADM

## 2023-11-01 RX ORDER — PROPOFOL 10 MG/ML
INJECTION, EMULSION INTRAVENOUS CONTINUOUS PRN
Status: DISCONTINUED | OUTPATIENT
Start: 2023-11-01 | End: 2023-11-01

## 2023-11-01 RX ORDER — SODIUM CHLORIDE, SODIUM LACTATE, POTASSIUM CHLORIDE, CALCIUM CHLORIDE 600; 310; 30; 20 MG/100ML; MG/100ML; MG/100ML; MG/100ML
INJECTION, SOLUTION INTRAVENOUS CONTINUOUS PRN
Status: DISCONTINUED | OUTPATIENT
Start: 2023-11-01 | End: 2023-11-01

## 2023-11-01 RX ORDER — HYDROCODONE BITARTRATE AND ACETAMINOPHEN 5; 325 MG/1; MG/1
1 TABLET ORAL
Status: COMPLETED | OUTPATIENT
Start: 2023-11-01 | End: 2023-11-01

## 2023-11-01 RX ORDER — OXYCODONE HYDROCHLORIDE 5 MG/1
10 TABLET ORAL EVERY 4 HOURS PRN
Status: DISCONTINUED | OUTPATIENT
Start: 2023-11-01 | End: 2023-11-01 | Stop reason: HOSPADM

## 2023-11-01 RX ORDER — OXYCODONE HYDROCHLORIDE 5 MG/1
5 TABLET ORAL EVERY 4 HOURS PRN
Status: DISCONTINUED | OUTPATIENT
Start: 2023-11-01 | End: 2023-11-01 | Stop reason: HOSPADM

## 2023-11-01 RX ORDER — ONDANSETRON 2 MG/ML
4 INJECTION INTRAMUSCULAR; INTRAVENOUS EVERY 30 MIN PRN
Status: DISCONTINUED | OUTPATIENT
Start: 2023-11-01 | End: 2023-11-01 | Stop reason: HOSPADM

## 2023-11-01 RX ORDER — ASPIRIN 325 MG
325 TABLET ORAL DAILY
Qty: 14 TABLET | Refills: 0 | Status: SHIPPED | OUTPATIENT
Start: 2023-11-01

## 2023-11-01 RX ORDER — CITRIC ACID/SODIUM CITRATE 334-500MG
30 SOLUTION, ORAL ORAL ONCE
Status: COMPLETED | OUTPATIENT
Start: 2023-11-01 | End: 2023-11-01

## 2023-11-01 RX ORDER — HYDROMORPHONE HCL IN WATER/PF 6 MG/30 ML
0.2 PATIENT CONTROLLED ANALGESIA SYRINGE INTRAVENOUS EVERY 5 MIN PRN
Status: DISCONTINUED | OUTPATIENT
Start: 2023-11-01 | End: 2023-11-01 | Stop reason: HOSPADM

## 2023-11-01 RX ORDER — BUPIVACAINE HYDROCHLORIDE 5 MG/ML
INJECTION, SOLUTION EPIDURAL; INTRACAUDAL PRN
Status: DISCONTINUED | OUTPATIENT
Start: 2023-11-01 | End: 2023-11-01 | Stop reason: HOSPADM

## 2023-11-01 RX ORDER — DEXAMETHASONE SODIUM PHOSPHATE 4 MG/ML
4 INJECTION, SOLUTION INTRA-ARTICULAR; INTRALESIONAL; INTRAMUSCULAR; INTRAVENOUS; SOFT TISSUE
Status: DISCONTINUED | OUTPATIENT
Start: 2023-11-01 | End: 2023-11-01 | Stop reason: HOSPADM

## 2023-11-01 RX ORDER — LIDOCAINE 40 MG/G
CREAM TOPICAL
Status: DISCONTINUED | OUTPATIENT
Start: 2023-11-01 | End: 2023-11-01 | Stop reason: HOSPADM

## 2023-11-01 RX ORDER — DEXAMETHASONE SODIUM PHOSPHATE 4 MG/ML
INJECTION, SOLUTION INTRA-ARTICULAR; INTRALESIONAL; INTRAMUSCULAR; INTRAVENOUS; SOFT TISSUE PRN
Status: DISCONTINUED | OUTPATIENT
Start: 2023-11-01 | End: 2023-11-01

## 2023-11-01 RX ORDER — KETOROLAC TROMETHAMINE 30 MG/ML
30 INJECTION, SOLUTION INTRAMUSCULAR; INTRAVENOUS EVERY 6 HOURS PRN
Status: DISCONTINUED | OUTPATIENT
Start: 2023-11-01 | End: 2023-11-01 | Stop reason: HOSPADM

## 2023-11-01 RX ORDER — HYDROCODONE BITARTRATE AND ACETAMINOPHEN 5; 325 MG/1; MG/1
1-2 TABLET ORAL EVERY 4 HOURS PRN
Qty: 30 TABLET | Refills: 0 | Status: SHIPPED | OUTPATIENT
Start: 2023-11-01

## 2023-11-01 RX ORDER — CEFAZOLIN SODIUM/WATER 3 G/30 ML
3 SYRINGE (ML) INTRAVENOUS
Status: DISCONTINUED | OUTPATIENT
Start: 2023-11-01 | End: 2023-11-01 | Stop reason: HOSPADM

## 2023-11-01 RX ORDER — LABETALOL HYDROCHLORIDE 5 MG/ML
10 INJECTION, SOLUTION INTRAVENOUS EVERY 10 MIN PRN
Status: DISCONTINUED | OUTPATIENT
Start: 2023-11-01 | End: 2023-11-01 | Stop reason: HOSPADM

## 2023-11-01 RX ORDER — FENTANYL CITRATE 50 UG/ML
25 INJECTION, SOLUTION INTRAMUSCULAR; INTRAVENOUS
Status: DISCONTINUED | OUTPATIENT
Start: 2023-11-01 | End: 2023-11-01 | Stop reason: HOSPADM

## 2023-11-01 RX ORDER — MAGNESIUM HYDROXIDE 1200 MG/15ML
LIQUID ORAL PRN
Status: DISCONTINUED | OUTPATIENT
Start: 2023-11-01 | End: 2023-11-01 | Stop reason: HOSPADM

## 2023-11-01 RX ORDER — PROPOFOL 10 MG/ML
INJECTION, EMULSION INTRAVENOUS PRN
Status: DISCONTINUED | OUTPATIENT
Start: 2023-11-01 | End: 2023-11-01

## 2023-11-01 RX ORDER — ONDANSETRON 2 MG/ML
INJECTION INTRAMUSCULAR; INTRAVENOUS PRN
Status: DISCONTINUED | OUTPATIENT
Start: 2023-11-01 | End: 2023-11-01

## 2023-11-01 RX ORDER — IBUPROFEN 600 MG/1
600 TABLET, FILM COATED ORAL
Status: DISCONTINUED | OUTPATIENT
Start: 2023-11-01 | End: 2023-11-01 | Stop reason: HOSPADM

## 2023-11-01 RX ORDER — FENTANYL CITRATE 50 UG/ML
INJECTION, SOLUTION INTRAMUSCULAR; INTRAVENOUS PRN
Status: DISCONTINUED | OUTPATIENT
Start: 2023-11-01 | End: 2023-11-01

## 2023-11-01 RX ORDER — ONDANSETRON 4 MG/1
4 TABLET, ORALLY DISINTEGRATING ORAL EVERY 30 MIN PRN
Status: DISCONTINUED | OUTPATIENT
Start: 2023-11-01 | End: 2023-11-01 | Stop reason: HOSPADM

## 2023-11-01 RX ORDER — HYDROXYZINE HYDROCHLORIDE 25 MG/1
25 TABLET, FILM COATED ORAL 3 TIMES DAILY PRN
Qty: 30 TABLET | Refills: 0 | Status: SHIPPED | OUTPATIENT
Start: 2023-11-01

## 2023-11-01 RX ORDER — FENTANYL CITRATE 50 UG/ML
50 INJECTION, SOLUTION INTRAMUSCULAR; INTRAVENOUS EVERY 5 MIN PRN
Status: DISCONTINUED | OUTPATIENT
Start: 2023-11-01 | End: 2023-11-01 | Stop reason: HOSPADM

## 2023-11-01 RX ADMIN — FENTANYL CITRATE 50 MCG: 50 INJECTION INTRAMUSCULAR; INTRAVENOUS at 11:44

## 2023-11-01 RX ADMIN — HYDROMORPHONE HYDROCHLORIDE 1 MG: 1 INJECTION, SOLUTION INTRAMUSCULAR; INTRAVENOUS; SUBCUTANEOUS at 11:44

## 2023-11-01 RX ADMIN — FENTANYL CITRATE 50 MCG: 50 INJECTION INTRAMUSCULAR; INTRAVENOUS at 11:25

## 2023-11-01 RX ADMIN — BUPIVACAINE HYDROCHLORIDE 30 ML: 5 INJECTION, SOLUTION EPIDURAL; INTRACAUDAL at 11:13

## 2023-11-01 RX ADMIN — SODIUM CHLORIDE, POTASSIUM CHLORIDE, SODIUM LACTATE AND CALCIUM CHLORIDE: 600; 310; 30; 20 INJECTION, SOLUTION INTRAVENOUS at 11:18

## 2023-11-01 RX ADMIN — LIDOCAINE HYDROCHLORIDE 50 MG: 20 INJECTION, SOLUTION INFILTRATION; PERINEURAL at 11:25

## 2023-11-01 RX ADMIN — SODIUM CITRATE AND CITRIC ACID MONOHYDRATE 30 ML: 500; 334 SOLUTION ORAL at 13:14

## 2023-11-01 RX ADMIN — MIDAZOLAM 2 MG: 1 INJECTION INTRAMUSCULAR; INTRAVENOUS at 11:00

## 2023-11-01 RX ADMIN — PROPOFOL 200 MG: 10 INJECTION, EMULSION INTRAVENOUS at 11:25

## 2023-11-01 RX ADMIN — HYDROCODONE BITARTRATE AND ACETAMINOPHEN 1 TABLET: 5; 325 TABLET ORAL at 14:14

## 2023-11-01 RX ADMIN — PHENYLEPHRINE HYDROCHLORIDE 100 MCG: 10 INJECTION INTRAVENOUS at 12:16

## 2023-11-01 RX ADMIN — KETOROLAC TROMETHAMINE 30 MG: 30 INJECTION, SOLUTION INTRAMUSCULAR; INTRAVENOUS at 15:39

## 2023-11-01 RX ADMIN — PROPOFOL 50 MCG/KG/MIN: 10 INJECTION, EMULSION INTRAVENOUS at 11:29

## 2023-11-01 RX ADMIN — DEXAMETHASONE SODIUM PHOSPHATE 8 MG: 4 INJECTION, SOLUTION INTRA-ARTICULAR; INTRALESIONAL; INTRAMUSCULAR; INTRAVENOUS; SOFT TISSUE at 11:26

## 2023-11-01 RX ADMIN — Medication 3 G: at 11:20

## 2023-11-01 RX ADMIN — ONDANSETRON 4 MG: 2 INJECTION INTRAMUSCULAR; INTRAVENOUS at 12:09

## 2023-11-01 ASSESSMENT — ACTIVITIES OF DAILY LIVING (ADL)
ADLS_ACUITY_SCORE: 37
ADLS_ACUITY_SCORE: 35
ADLS_ACUITY_SCORE: 35

## 2023-11-01 NOTE — ANESTHESIA PROCEDURE NOTES
Airway       Patient location during procedure: OR       Procedure Start/Stop Times: 11/1/2023 11:27 AM  Staff -        Anesthesiologist:  Ventura Bennett MD       CRNA: Cristin Marcum APRN CRNA       Performed By: CRNA  Consent for Airway        Urgency: elective  Indications and Patient Condition       Indications for airway management: luis-procedural       Induction type:intravenous       Mask difficulty assessment: 0 - not attempted    Final Airway Details       Final airway type: supraglottic airway    Supraglottic Airway Details        Type: LMA       Brand: I-Gel       LMA size: 4    Post intubation assessment        Placement verified by: capnometry and chest rise        Number of attempts at approach: 1       Number of other approaches attempted: 0       Secured with: commercial tube fontana       Ease of procedure: easy       Dentition: Intact and Unchanged    Medication(s) Administered   Medication Administration Time: 11/1/2023 11:27 AM

## 2023-11-01 NOTE — PROGRESS NOTES
SPIRITUAL HEALTH SERVICES  [facility] Pre-Op  PRE-SURGICAL VISIT     I visited with Yuliet prior to surgery today. Today is Yuliet's birthday and [place] Saints Day (in the Catholic tradition) and she shared that she feels the comfort of many prayers from her family, including her 92 year old mother.     At Yuliet's request, I provided a prayer for surgery to go well and for her mother in this time of transition.     [facility] remains available.     Jennifer Weeks MDiv  Staff   [facility] Pager: [phone]    [facility] available 24/7 for emergent requests/referrals, either by having the on-call  paged or by entering an ASAP/STAT consult in Epic (this will also page the on-call ).

## 2023-11-01 NOTE — OP NOTE
[facility]   Operative Note    Pre-operative diagnosis: Right peroneus longus tendon tear and tendinopathy  Right peroneus brevis tendon tear  Right painful peroneal tubercle   Post-operative diagnosis Same   Procedure: Right peroneus longus debridement with repair  Right peroneus brevis debridement with repair  Right peroneus longus to brevis tendon transfer-side to side  Right peroneal tubercle excision   Surgeon(s): Surgeon(s) and Role:     * Ricardo Acuña MD - Primary     * Rosario Avendano PA-C - Assisting     * Cecilio Cohn MD - Fellow - Assisting  Skilled assistant was present throughout the entirety the case was essential for patient positioning soft tissue traction wound closure cast placement patient transport.   Estimated blood loss: 10 mL    Specimens: * No specimens in log *   Findings: Significant tendinopathy and tearing of the peroneus longus tendon.  There is a small split within the peroneus brevis tendon.  Large peroneal tubercle present.     Indications: This is a 53-year-old female was seen my clinic was diagnosed with the above-mentioned diagnoses.  A long discussion was had with the patient regarding her options at this point.  We could continue nonsurgical management or we could consider surgical management.  We did discuss the risks of surgery.  We also discussed the postoperative rehabilitation protocol.  After discussing all these details and failing conservative treatment measures she elected to undergo the above-mentioned procedures.    Description of procedure:   Patient met in the preoperative area and the operative site, the right ankle, signed by myself.  Preoperative antibiotics were given.  The patient then brought back to the operating room was placed in the supine position on the operating table.  All bony prominences were padded at this time.  She then underwent general anesthesia.  She was then prepped and draped in normal sterile fashion.   A timeout was then called ensuring we are operating the correct site and the correct patient.  All staff concerns were addressed this time.    Following the timeout the right lower extremity was elevated and a tourniquet was placed.  An incision was made over the lateral aspect of the foot and dissection was carried down from the fibula down to the base of the fifth metatarsal.  The skin and subcutaneous tissue were dissected and hemostasis was achieved.  We identified the peroneal tendon sheath.  We opened up the sheath and there was a substantial amount of fluid that extravasated from the sheath.  Once we had removed all this fluid there was a separate sheath that the peroneus longus was then and in between these 2 sheets was the large peroneal tubercle which was easily palpated.  Once we had retracted both tendons out of the way we used a rongeur in order to remove the peroneal tubercle.  We used a rasp in order to smooth out the lateral aspect of the calcaneus.  We then placed bone wax over the lateral aspect of the calcaneus.  We then debrided the peroneus longus tendon extensively.  The peroneus brevis had only a small amount of tenosynovitis and this was debrided.  Once we debrided both tendons we had good quality tendon tissue remaining.  We removed approximately 30 to 40% of the caliber of the peroneus longus tendon.  This was all degenerative fibrous tissue.  At this point I was concerned with the amount of tendon that remained for the peroneus longus and thus a side to side transfer was performed.  We used 0 PDS suture in order to perform a side-to-side repair.  We then took the ankle through range of motion and it glided very nicely behind the fibula and also distally.  At this point we thoroughly irrigated the wound and closed in layered fashion using 3-0 Monocryl and 3-0 nylon suture.  Sterile bandages were placed and the patient she was placed into a postoperative cast.  She was then transferred off  the operating table and brought back to the postanesthesia care unit where she woke without any difficulty.    All counts correct at the end the case.    Postoperative plan: The patient be nonweightbearing in the right lower extremity for the next 2 weeks.  She will be seen in my clinic in 2 weeks for suture removal and cast removal.  At that time she will be placed into a tall cam walker boot and will be weightbearing as tolerated.  She will begin physical therapy at that time.

## 2023-11-01 NOTE — ANESTHESIA POSTPROCEDURE EVALUATION
Patient: Yuliet Gallego    Procedure: Procedure(s):  Right ankle peroneus longus tendon repair, right ankle peroneus brevis tendon repair, right ankle peroneal tubercle excision       Anesthesia Type:  General    Note:     Postop Pain Control: Uneventful            Sign Out: Well controlled pain   PONV: No   Neuro/Psych: Uneventful            Sign Out: Acceptable/Baseline neuro status   Airway/Respiratory: Uneventful            Sign Out: Acceptable/Baseline resp. status   CV/Hemodynamics: Uneventful            Sign Out: Acceptable CV status   Other NRE: NONE   DID A NON-ROUTINE EVENT OCCUR? No           Last vitals:  Vitals Value Taken Time   /80 11/01/23 1315   Temp 97.1  F (36.2  C) 11/01/23 1240   Pulse 76 11/01/23 1324   Resp 19 11/01/23 1324   SpO2 95 % 11/01/23 1324   Vitals shown include unfiled device data.    Electronically Signed By: Ventura Bennett MD  November 1, 2023  1:25 PM

## 2023-11-01 NOTE — ANESTHESIA PROCEDURE NOTES
"Sciatic Procedure Note    Pre-Procedure   Staff -        Anesthesiologist:  Ventura Bennett MD       Performed By: anesthesiologist       Location: pre-op       Procedure Start/Stop Times: 11/1/2023 11:00 AM and 11/1/2023 11:13 AM       Pre-Anesthestic Checklist: patient identified, IV checked, site marked, risks and benefits discussed, informed consent, monitors and equipment checked, pre-op evaluation, at physician/surgeon's request and post-op pain management  Timeout:       Correct Patient: Yes        Correct Procedure: Yes        Correct Site: Yes        Correct Position: Yes        Correct Laterality: Yes        Site Marked: Yes  Procedure Documentation  Procedure: Sciatic       Laterality: right       Patient Position: supine       Patient Prep/Sterile Barriers: sterile gloves, mask       Skin prep: Betadine       Local skin infiltrated with 0.7 mL of 1% lidocaine.  (popliteal approach).       Needle Type: insulated       Needle Gauge: 21.        Needle Length (Inches): 4        Ultrasound guided       1. Ultrasound was used to identify targeted nerve, plexus, vascular marker, or fascial plane and place a needle adjacent to it in real-time.       2. Ultrasound was used to visualize the spread of anesthetic in close proximity to the above referenced structure.       Nerve Stim: Initial Level 1 mA.  Lowest motor response mA.    Assessment/Narrative         The placement was negative for: blood aspirated and painful injection       Paresthesias: No.       Bolus given via needle..        Secured via.        Insertion/Infusion Method: Single Shot       Complications: none    Medication(s) Administered   Bupivacaine 0.5% PF (Infiltration) - Infiltration   30 mL - 11/1/2023 11:13:00 AM  Medication Administration Time: 11/1/2023 11:00 AM      FOR [facility] ([facility] ONLY:   Pain Team Contact information: please page the Pain Team Via [employer]aven[employer]. Search \"Pain\". During daytime hours, please page the attending " first. At night please page the resident first.

## 2023-11-01 NOTE — ANESTHESIA PREPROCEDURE EVALUATION
Anesthesia Pre-Procedure Evaluation    Patient: Yuliet Gallego   MRN: [id number] : 1970        Procedure : Procedure(s):  Right ankle peroneus longus tendon repair, right ankle peroneus brevis tendon repair, right ankle peroneal tubercle excision          Past Medical History:   Diagnosis Date    Arthritis     hip, back, shoulder    Hypertension     Irregular heart beat     Other chronic pain     back, rt hip into leg    PONV (postoperative nausea and vomiting)     with rotator cuff surgery      Past Surgical History:   Procedure Laterality Date    APPENDECTOMY      ARTHROPLASTY HIP Right 3/8/2021    Procedure: Right total hip arthroplasty using a Biomet Taperloc femoral stem and G7 acetabulum;  Surgeon: Toni Zamudio MD;  Location: [facility] OR    CHOLECYSTECTOMY      DECOMPRESS DISC RF LUMBAR      ENT SURGERY      wisdom teeth removal    FUSION CERVICAL ANTERIOR, POSTERIOR THREE+ LEVELS, COMBINED      FUSION SPINE POSTERIOR ONE LEVEL      HEMILAMINECTOMY, DISCECTOMY LUMBAR ONE LEVEL, COMBINED      x2    ROTATOR CUFF REPAIR RT/LT Right       Allergies   Allergen Reactions    Adhesive Tape Blisters    Bees Swelling    Sulfa Antibiotics Hives     fainting      Social History     Tobacco Use    Smoking status: Former     Years: 14     Types: Cigarettes     Quit date: 2000     Years since quittin.0    Smokeless tobacco: Never   Substance Use Topics    Alcohol use: Yes     Comment: 2-3 drinks/month      Wt Readings from Last 1 Encounters:   23 149.2 kg (329 lb)        Anesthesia Evaluation    Type: General.        ROS/MED HX  ENT/Pulmonary:  - neg pulmonary ROS     Neurologic:  - neg neurologic ROS     Cardiovascular:     (+)  hypertension- -   -  - -                          Irregular Heartbeat/Palpitations,            METS/Exercise Tolerance:     Hematologic:  - neg hematologic  ROS     Musculoskeletal: Comment: Ankle pathology      GI/Hepatic:  - neg GI/hepatic ROS     Renal/Genitourinary:   "- neg Renal ROS     Endo:  - neg endo ROS   (+)               Obesity,       Psychiatric/Substance Use:  - neg psychiatric ROS     Infectious Disease:  - neg infectious disease ROS     Malignancy:  - neg malignancy ROS     Other:  - neg other ROS    (+)  , H/O Chronic Pain,         Physical Exam    Airway        Mallampati: I   TM distance: > 3 FB   Neck ROM: full   Mouth opening: > 3 cm    Respiratory Devices and Support         Dental  no notable dental history         Cardiovascular   cardiovascular exam normal          Pulmonary   pulmonary exam normal                OUTSIDE LABS:  CBC:   Lab Results   Component Value Date    WBC 11.2 (H) 03/09/2021    HGB 10.8 (L) 03/09/2021    HGB 10.4 (L) 03/09/2021    HCT 34.9 (L) 03/09/2021     03/09/2021    PLT 12 (LL) 03/09/2021     BMP:   Lab Results   Component Value Date     03/09/2021    POTASSIUM 3.8 03/09/2021    CHLORIDE 104 03/09/2021    CO2 26 03/09/2021    BUN 12 03/09/2021    CR 0.77 03/09/2021    CR 0.68 03/08/2021     (H) 03/09/2021     (H) 03/09/2021     COAGS: No results found for: \"PTT\", \"INR\", \"FIBR\"  POC:   Lab Results   Component Value Date    HCG Negative 03/08/2021     HEPATIC:   Lab Results   Component Value Date    ALBUMIN 2.9 (L) 03/09/2021    PROTTOTAL 6.3 (L) 03/09/2021    ALT 21 03/09/2021    AST 45 03/09/2021    ALKPHOS 52 03/09/2021    BILITOTAL 0.3 03/09/2021     OTHER:   Lab Results   Component Value Date    LOTUS 8.2 (L) 03/09/2021       Anesthesia Plan    ASA Status:  3    NPO Status:  NPO Appropriate    Anesthesia Type: General.     - Airway: LMA   Induction: Intravenous.   Maintenance: Balanced.        Consents    Anesthesia Plan(s) and associated risks, benefits, and realistic alternatives discussed. Questions answered and patient/representative(s) expressed understanding.     - Discussed:     - Discussed with:  Patient            Postoperative Care    Pain management: IV analgesics, Oral pain medications, " Multi-modal analgesia, Peripheral nerve block (Single Shot).   PONV prophylaxis: Ondansetron (or other 5HT-3), Dexamethasone or Solumedrol     Comments:                Ventura Bennett MD

## 2023-11-01 NOTE — DISCHARGE INSTRUCTIONS
"GENERAL ANESTHESIA OR SEDATION ADULT DISCHARGE INSTRUCTIONS   SPECIAL PRECAUTIONS FOR 24 HOURS AFTER SURGERY    IT IS NOT UNUSUAL TO FEEL LIGHT-HEADED OR FAINT, UP TO 24 HOURS AFTER SURGERY OR WHILE TAKING PAIN MEDICATION.  IF YOU HAVE THESE SYMPTOMS; SIT FOR A FEW MINUTES BEFORE STANDING AND HAVE SOMEONE ASSIST YOU WHEN YOU GET UP TO WALK OR USE THE BATHROOM.    YOU SHOULD REST AND RELAX FOR THE NEXT 24 HOURS AND YOU MUST MAKE ARRANGEMENTS TO HAVE SOMEONE STAY WITH YOU FOR AT LEAST 24 HOURS AFTER YOUR DISCHARGE.  AVOID HAZARDOUS AND STRENUOUS ACTIVITIES.  DO NOT MAKE IMPORTANT DECISIONS FOR 24 HOURS.    DO NOT DRIVE ANY VEHICLE OR OPERATE MECHANICAL EQUIPMENT FOR 24 HOURS FOLLOWING THE END OF YOUR SURGERY.  EVEN THOUGH YOU MAY FEEL NORMAL, YOUR REACTIONS MAY BE AFFECTED BY THE MEDICATION YOU HAVE RECEIVED.    DO NOT DRINK ALCOHOLIC BEVERAGES FOR 24 HOURS FOLLOWING YOUR SURGERY.    DRINK CLEAR LIQUIDS (APPLE JUICE, GINGER ALE, 7-UP, BROTH, ETC.).  PROGRESS TO YOUR REGULAR DIET AS YOU FEEL ABLE.    YOU MAY HAVE A DRY MOUTH, A SORE THROAT, MUSCLES ACHES OR TROUBLE SLEEPING.  THESE SHOULD GO AWAY AFTER 24 HOURS.    CALL YOUR DOCTOR FOR ANY OF THE FOLLOWING:  SIGNS OF INFECTION (FEVER, GROWING TENDERNESS AT THE SURGERY SITE, A LARGE AMOUNT OF DRAINAGE OR BLEEDING, SEVERE PAIN, FOUL-SMELLING DRAINAGE, REDNESS OR SWELLING.    IT HAS BEEN OVER 8 TO 10 HOURS SINCE SURGERY AND YOU ARE STILL NOT ABLE TO URINATE (PASS WATER).     Please use the incentive spirometer at home the rest of the day today. Use spirometer every \"commercial break\" as instructed.    DR. GARETH KOEHLER M.D.   CLINIC PHONE NUMBER:  [phone]  [facility]    You received Toradol, an IV form of Ibuprofen (Motrin) at 3:45 PM.  Do not take any Ibuprofen products until after 9:45 PM.    "

## 2023-11-01 NOTE — ANESTHESIA CARE TRANSFER NOTE
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    Anesthesia Type:   General     Note:    Oropharynx: oropharynx clear of all foreign objects and spontaneously breathing  Level of Consciousness: awake  Oxygen Supplementation: face mask  Level of Supplemental Oxygen (L/min / FiO2): 6  Independent Airway: airway patency satisfactory and stable  Dentition: dentition unchanged  Vital Signs Stable: post-procedure vital signs reviewed and stable  Report to RN Given: handoff report given  Patient transferred to: PACU    Handoff Report: Identifed the Patient, Identified the Reponsible Provider, Reviewed the pertinent medical history, Discussed the surgical course, Reviewed Intra-OP anesthesia mangement and issues during anesthesia, Set expectations for post-procedure period and Allowed opportunity for questions and acknowledgement of understanding      Vitals:  Vitals Value Taken Time   BP     Temp     Pulse 90 11/01/23 1240   Resp 23 11/01/23 1240   SpO2 100 % 11/01/23 1240   Vitals shown include unfiled device data.    Electronically Signed By: RAJINDER Paige CRNA  November 1, 2023  12:41 PM

## 2023-11-19 ENCOUNTER — HEALTH MAINTENANCE LETTER (OUTPATIENT)
Age: 53
End: 2023-11-19

## 2024-01-01 ENCOUNTER — RX ONLY (RX ONLY)
Age: 54
End: 2024-01-01

## 2024-02-01 ENCOUNTER — RX ONLY (RX ONLY)
Age: 54
End: 2024-02-01

## 2024-12-29 ENCOUNTER — HEALTH MAINTENANCE LETTER (OUTPATIENT)
Age: 54
End: 2024-12-29

## 2025-02-20 ENCOUNTER — APPOINTMENT (OUTPATIENT)
Age: 55
Setting detail: DERMATOLOGY
End: 2025-02-20

## 2025-02-20 DIAGNOSIS — D22 MELANOCYTIC NEVI: ICD-10-CM

## 2025-02-20 DIAGNOSIS — D18.0 HEMANGIOMA: ICD-10-CM

## 2025-02-20 DIAGNOSIS — L82.1 OTHER SEBORRHEIC KERATOSIS: ICD-10-CM

## 2025-02-20 DIAGNOSIS — Z87.2 PERSONAL HISTORY OF DISEASES OF THE SKIN AND SUBCUTANEOUS TISSUE: ICD-10-CM

## 2025-02-20 DIAGNOSIS — Z86.006 PERSONAL HISTORY OF MELANOMA IN-SITU: ICD-10-CM

## 2025-02-20 DIAGNOSIS — Z71.89 OTHER SPECIFIED COUNSELING: ICD-10-CM

## 2025-02-20 PROBLEM — D18.01 HEMANGIOMA OF SKIN AND SUBCUTANEOUS TISSUE: Status: ACTIVE | Noted: 2025-02-20

## 2025-02-20 PROBLEM — D22.5 MELANOCYTIC NEVI OF TRUNK: Status: ACTIVE | Noted: 2025-02-20

## 2025-02-20 PROBLEM — D23.71 OTHER BENIGN NEOPLASM OF SKIN OF RIGHT LOWER LIMB, INCLUDING HIP: Status: ACTIVE | Noted: 2025-02-20

## 2025-02-20 PROCEDURE — ? COUNSELING

## 2025-02-20 PROCEDURE — ? SUNSCREEN RECOMMENDATIONS

## 2025-02-20 PROCEDURE — 99213 OFFICE O/P EST LOW 20 MIN: CPT

## 2025-02-20 PROCEDURE — ? OBSERVATION

## 2025-02-20 ASSESSMENT — LOCATION SIMPLE DESCRIPTION DERM
LOCATION SIMPLE: RIGHT LOWER BACK
LOCATION SIMPLE: LEFT POSTERIOR THIGH
LOCATION SIMPLE: UPPER BACK
LOCATION SIMPLE: CHEST
LOCATION SIMPLE: ABDOMEN
LOCATION SIMPLE: LEFT FOREHEAD
LOCATION SIMPLE: LEFT THIGH

## 2025-02-20 ASSESSMENT — LOCATION DETAILED DESCRIPTION DERM
LOCATION DETAILED: RIGHT SUPERIOR MEDIAL MIDBACK
LOCATION DETAILED: INFERIOR THORACIC SPINE
LOCATION DETAILED: MIDDLE STERNUM
LOCATION DETAILED: LEFT INFERIOR MEDIAL FOREHEAD
LOCATION DETAILED: LEFT LATERAL SUPERIOR CHEST
LOCATION DETAILED: SUPERIOR THORACIC SPINE
LOCATION DETAILED: LEFT DISTAL POSTERIOR THIGH
LOCATION DETAILED: EPIGASTRIC SKIN
LOCATION DETAILED: LEFT ANTERIOR PROXIMAL THIGH

## 2025-02-20 ASSESSMENT — LOCATION ZONE DERM
LOCATION ZONE: FACE
LOCATION ZONE: LEG
LOCATION ZONE: TRUNK

## 2025-02-20 NOTE — HPI: SKIN LESION
What Type Of Note Output Would You Prefer (Optional)?: Bullet Format
How Severe Is Your Skin Lesion?: moderate
Is This A New Presentation, Or A Follow-Up?: Skin Lesion
Additional History: She states it bled 2 weeks ago.

## 2025-02-20 NOTE — PROCEDURE: OBSERVATION
Body Location Override (Optional - Billing Will Still Be Based On Selected Body Map Location If Applicable): left lateral chest
Detail Level: Detailed
Size Of Lesion In Cm (Optional): 0
Body Location Override (Optional - Billing Will Still Be Based On Selected Body Map Location If Applicable): left upper thigh

## (undated) DEVICE — SU VICRYL 0 CT-1 27" J340H

## (undated) DEVICE — SPONGE RAY-TEC 4X8" 7318

## (undated) DEVICE — SU FIBERWIRE 2 38"  AR-7200

## (undated) DEVICE — HOOD FLYTE W/PEELAWAY 408-800-100

## (undated) DEVICE — BONE WAX 2.5GM W31G

## (undated) DEVICE — LINEN HALF SHEET 5512

## (undated) DEVICE — GOWN IMPERVIOUS SPECIALTY XLG/XLONG 32474

## (undated) DEVICE — PREP CHLORAPREP 26ML TINTED HI-LITE ORANGE 930815

## (undated) DEVICE — DRSG ABDOMINAL 07 1/2X8" 7197D

## (undated) DEVICE — SOL WATER IRRIG 1000ML BOTTLE 2F7114

## (undated) DEVICE — GLOVE PROTEXIS POWDER FREE 7.0 ORTHOPEDIC 2D73ET70

## (undated) DEVICE — SU STRATAFIX PDS PLUS 1 CT-1 12" SXPP1A443

## (undated) DEVICE — LINEN FULL SHEET 5511

## (undated) DEVICE — CAST PADDING 4" STERILE 9044S

## (undated) DEVICE — GLOVE BIOGEL PI MICRO SZ 8.5 48585

## (undated) DEVICE — GLOVE PROTEXIS POWDER FREE 7.5 ORTHOPEDIC 2D73ET75

## (undated) DEVICE — DRAPE STERI TOWEL LG 1010

## (undated) DEVICE — SU ETHIBOND 5 V-37 4X30" MB66G

## (undated) DEVICE — TOURNIQUET CUFF 24" YELLOW LF 5921-024-135

## (undated) DEVICE — LINEN ORTHO ACL PACK 5447

## (undated) DEVICE — DRSG GAUZE 4X4" 8044

## (undated) DEVICE — SET HANDPIECE INTERPULSE W/COAXIAL FAN SPRAY TIP 0210118000

## (undated) DEVICE — SU ETHILON 3-0 PS-2 18" 1669H

## (undated) DEVICE — SU VICRYL+ 1 MO-4 18" DYED VCP702D

## (undated) DEVICE — ESU GROUND PAD ADULT W/CORD E7507

## (undated) DEVICE — SUTURE MONOCRYL+ 2-0 CT-1 36" UNDYED MCP945H

## (undated) DEVICE — DRAPE LEGGINGS 8421

## (undated) DEVICE — Device

## (undated) DEVICE — SUCTION MANIFOLD NEPTUNE 2 SYS 4 PORT 0702-020-000

## (undated) DEVICE — PACK LOWER EXTREMITY RIDGES

## (undated) DEVICE — BAG CLEAR TRASH 1.3M 39X33" P4040C

## (undated) DEVICE — GLOVE PROTEXIS BLUE W/NEU-THERA 7.0  2D73EB70

## (undated) DEVICE — GLOVE BIOGEL PI MICRO SZ 6.5 48565

## (undated) DEVICE — SU MONOCRYL 2-0 CT-1 36" UND Y945H

## (undated) DEVICE — SU PDS II 0 CT-2 27" Z334H

## (undated) DEVICE — SOL NACL 0.9% INJ 1000ML BAG 2B1324X

## (undated) DEVICE — PACK TOTAL HIP RIDGES LATEX PO15HIFSG

## (undated) DEVICE — GLOVE PROTEXIS POWDER FREE SMT 8.0  2D72PT80X

## (undated) DEVICE — LINEN DRAPE 54X72" 5467

## (undated) DEVICE — BLADE SAW SAGITTAL STRK 25X90X1.27MM HD SYS 6 6125-127-090

## (undated) DEVICE — CAST BUCKET

## (undated) DEVICE — GLOVE PROTEXIS BLUE W/NEU-THERA 8.0  2D73EB80

## (undated) DEVICE — DRSG AQUACEL AG 3.5X9.75" HYDROFIBER 412011

## (undated) DEVICE — ESU ELEC BLADE 6" COATED E1450-6

## (undated) DEVICE — DRSG AQUACEL AG 3.5X12" HYDROFIBER 420670

## (undated) DEVICE — SU MONOCRYL 2-0 SH 27" UND Y417H

## (undated) RX ORDER — GLYCOPYRROLATE 0.2 MG/ML
INJECTION INTRAMUSCULAR; INTRAVENOUS
Status: DISPENSED
Start: 2021-03-08

## (undated) RX ORDER — FENTANYL CITRATE 50 UG/ML
INJECTION, SOLUTION INTRAMUSCULAR; INTRAVENOUS
Status: DISPENSED
Start: 2023-11-01

## (undated) RX ORDER — FENTANYL CITRATE 50 UG/ML
INJECTION, SOLUTION INTRAMUSCULAR; INTRAVENOUS
Status: DISPENSED
Start: 2021-03-08

## (undated) RX ORDER — PROPOFOL 10 MG/ML
INJECTION, EMULSION INTRAVENOUS
Status: DISPENSED
Start: 2021-03-08

## (undated) RX ORDER — ACETAMINOPHEN 325 MG/1
TABLET ORAL
Status: DISPENSED
Start: 2021-03-08

## (undated) RX ORDER — OXYCODONE HYDROCHLORIDE 5 MG/1
TABLET ORAL
Status: DISPENSED
Start: 2021-03-08

## (undated) RX ORDER — TRANEXAMIC ACID 650 MG/1
TABLET ORAL
Status: DISPENSED
Start: 2021-03-08

## (undated) RX ORDER — FENTANYL CITRATE-0.9 % NACL/PF 10 MCG/ML
PLASTIC BAG, INJECTION (ML) INTRAVENOUS
Status: DISPENSED
Start: 2023-11-01

## (undated) RX ORDER — BUPIVACAINE HYDROCHLORIDE 5 MG/ML
INJECTION, SOLUTION EPIDURAL; INTRACAUDAL
Status: DISPENSED
Start: 2023-11-01

## (undated) RX ORDER — LIDOCAINE HYDROCHLORIDE 10 MG/ML
INJECTION, SOLUTION EPIDURAL; INFILTRATION; INTRACAUDAL; PERINEURAL
Status: DISPENSED
Start: 2021-03-08

## (undated) RX ORDER — NEOSTIGMINE METHYLSULFATE 1 MG/ML
VIAL (ML) INJECTION
Status: DISPENSED
Start: 2021-03-08

## (undated) RX ORDER — KETOROLAC TROMETHAMINE 30 MG/ML
INJECTION, SOLUTION INTRAMUSCULAR; INTRAVENOUS
Status: DISPENSED
Start: 2023-11-01

## (undated) RX ORDER — CITRIC ACID/SODIUM CITRATE 334-500MG
SOLUTION, ORAL ORAL
Status: DISPENSED
Start: 2023-11-01

## (undated) RX ORDER — CEFAZOLIN SODIUM/WATER 3 G/30 ML
SYRINGE (ML) INTRAVENOUS
Status: DISPENSED
Start: 2023-11-01

## (undated) RX ORDER — ONDANSETRON 2 MG/ML
INJECTION INTRAMUSCULAR; INTRAVENOUS
Status: DISPENSED
Start: 2021-03-08

## (undated) RX ORDER — HYDROXYZINE HYDROCHLORIDE 25 MG/1
TABLET, FILM COATED ORAL
Status: DISPENSED
Start: 2021-03-08

## (undated) RX ORDER — IBUPROFEN 600 MG/1
TABLET, FILM COATED ORAL
Status: DISPENSED
Start: 2021-03-08

## (undated) RX ORDER — HYDROCODONE BITARTRATE AND ACETAMINOPHEN 5; 325 MG/1; MG/1
TABLET ORAL
Status: DISPENSED
Start: 2023-11-01

## (undated) RX ORDER — DEXAMETHASONE SODIUM PHOSPHATE 4 MG/ML
INJECTION, SOLUTION INTRA-ARTICULAR; INTRALESIONAL; INTRAMUSCULAR; INTRAVENOUS; SOFT TISSUE
Status: DISPENSED
Start: 2021-03-08

## (undated) RX ORDER — CEFAZOLIN SODIUM IN 0.9 % NACL 3 G/100 ML
INTRAVENOUS SOLUTION, PIGGYBACK (ML) INTRAVENOUS
Status: DISPENSED
Start: 2021-03-08